# Patient Record
Sex: FEMALE | Race: WHITE | NOT HISPANIC OR LATINO | Employment: PART TIME | URBAN - METROPOLITAN AREA
[De-identification: names, ages, dates, MRNs, and addresses within clinical notes are randomized per-mention and may not be internally consistent; named-entity substitution may affect disease eponyms.]

---

## 2017-01-17 ENCOUNTER — ALLSCRIPTS OFFICE VISIT (OUTPATIENT)
Dept: OTHER | Facility: OTHER | Age: 57
End: 2017-01-17

## 2017-01-17 DIAGNOSIS — Z12.31 ENCOUNTER FOR SCREENING MAMMOGRAM FOR MALIGNANT NEOPLASM OF BREAST: ICD-10-CM

## 2017-01-23 ENCOUNTER — HOSPITAL ENCOUNTER (OUTPATIENT)
Dept: RADIOLOGY | Age: 57
Discharge: HOME | End: 2017-01-23
Payer: COMMERCIAL

## 2017-01-23 DIAGNOSIS — Z12.31 ENCOUNTER FOR SCREENING MAMMOGRAM FOR MALIGNANT NEOPLASM OF BREAST: ICD-10-CM

## 2017-01-23 PROCEDURE — G0202 SCR MAMMO BI INCL CAD: HCPCS

## 2017-01-24 ENCOUNTER — GENERIC CONVERSION - ENCOUNTER (OUTPATIENT)
Dept: OTHER | Facility: OTHER | Age: 57
End: 2017-01-24

## 2017-04-11 ENCOUNTER — ALLSCRIPTS OFFICE VISIT (OUTPATIENT)
Dept: OTHER | Facility: OTHER | Age: 57
End: 2017-04-11

## 2017-04-26 ENCOUNTER — ALLSCRIPTS OFFICE VISIT (OUTPATIENT)
Dept: OTHER | Facility: OTHER | Age: 57
End: 2017-04-26

## 2017-04-26 DIAGNOSIS — M25.50 PAIN IN JOINT: ICD-10-CM

## 2017-04-26 DIAGNOSIS — Z13.1 ENCOUNTER FOR SCREENING FOR DIABETES MELLITUS: ICD-10-CM

## 2017-04-26 DIAGNOSIS — Z13.220 ENCOUNTER FOR SCREENING FOR LIPOID DISORDERS: ICD-10-CM

## 2017-05-14 LAB
A/G RATIO (HISTORICAL): 1.5 (CALC) (ref 1–2.5)
ALBUMIN SERPL BCP-MCNC: 4.3 G/DL (ref 3.6–5.1)
ALP SERPL-CCNC: 71 U/L (ref 33–130)
ALT SERPL W P-5'-P-CCNC: 17 U/L (ref 6–29)
AST SERPL W P-5'-P-CCNC: 18 U/L (ref 10–35)
BASOPHILS # BLD AUTO: 0.7 %
BASOPHILS # BLD AUTO: 23 CELLS/UL (ref 0–200)
BILIRUB SERPL-MCNC: 0.6 MG/DL (ref 0.2–1.2)
BUN SERPL-MCNC: 16 MG/DL (ref 7–25)
BUN/CREA RATIO (HISTORICAL): NORMAL (CALC) (ref 6–22)
CALCIUM SERPL-MCNC: 9.7 MG/DL (ref 8.6–10.4)
CHLORIDE SERPL-SCNC: 102 MMOL/L (ref 98–110)
CHOLEST SERPL-MCNC: 215 MG/DL (ref 125–200)
CHOLEST/HDLC SERPL: 3 (CALC)
CO2 SERPL-SCNC: 29 MMOL/L (ref 20–31)
CREAT SERPL-MCNC: 0.85 MG/DL (ref 0.5–1.05)
DEPRECATED RDW RBC AUTO: 13.7 % (ref 11–15)
EGFR AFRICAN AMERICAN (HISTORICAL): 90 ML/MIN/1.73M2
EGFR-AMERICAN CALC (HISTORICAL): 78 ML/MIN/1.73M2
EOSINOPHIL # BLD AUTO: 248 CELLS/UL (ref 15–500)
EOSINOPHIL # BLD AUTO: 7.5 %
GAMMA GLOBULIN (HISTORICAL): 2.9 G/DL (CALC) (ref 1.9–3.7)
GLUCOSE (HISTORICAL): 85 MG/DL (ref 65–99)
HCT VFR BLD AUTO: 42.1 % (ref 35–45)
HDLC SERPL-MCNC: 71 MG/DL
HGB BLD-MCNC: 13.9 G/DL (ref 11.7–15.5)
LDL CHOLESTEROL (HISTORICAL): 126 MG/DL (CALC)
LYMPHOCYTES # BLD AUTO: 24.9 %
LYMPHOCYTES # BLD AUTO: 822 CELLS/UL (ref 850–3900)
MCH RBC QN AUTO: 29 PG (ref 27–33)
MCHC RBC AUTO-ENTMCNC: 33.1 G/DL (ref 32–36)
MCV RBC AUTO: 87.6 FL (ref 80–100)
MONOCYTES # BLD AUTO: 350 CELLS/UL (ref 200–950)
MONOCYTES (HISTORICAL): 10.6 %
NEUTROPHILS # BLD AUTO: 1858 CELLS/UL (ref 1500–7800)
NEUTROPHILS # BLD AUTO: 56.3 %
NON-HDL-CHOL (CHOL-HDL) (HISTORICAL): 144 MG/DL (CALC)
PLATELET # BLD AUTO: 237 THOUSAND/UL (ref 140–400)
PMV BLD AUTO: 10 FL (ref 7.5–12.5)
POTASSIUM SERPL-SCNC: 4.1 MMOL/L (ref 3.5–5.3)
RBC # BLD AUTO: 4.81 MILLION/UL (ref 3.8–5.1)
SODIUM SERPL-SCNC: 138 MMOL/L (ref 135–146)
TOTAL PROTEIN (HISTORICAL): 7.2 G/DL (ref 6.1–8.1)
TRIGL SERPL-MCNC: 89 MG/DL
WBC # BLD AUTO: 3.3 THOUSAND/UL (ref 3.8–10.8)

## 2017-05-15 ENCOUNTER — GENERIC CONVERSION - ENCOUNTER (OUTPATIENT)
Dept: OTHER | Facility: OTHER | Age: 57
End: 2017-05-15

## 2017-06-02 ENCOUNTER — HOSPITAL ENCOUNTER (OUTPATIENT)
Facility: HOSPITAL | Age: 57
Setting detail: OUTPATIENT SURGERY
Discharge: HOME | End: 2017-06-02
Attending: ORTHOPAEDIC SURGERY | Admitting: ORTHOPAEDIC SURGERY
Payer: COMMERCIAL

## 2017-06-02 VITALS
RESPIRATION RATE: 18 BRPM | OXYGEN SATURATION: 100 % | HEART RATE: 80 BPM | TEMPERATURE: 97.7 F | HEIGHT: 65 IN | SYSTOLIC BLOOD PRESSURE: 148 MMHG | WEIGHT: 185 LBS | BODY MASS INDEX: 30.82 KG/M2 | DIASTOLIC BLOOD PRESSURE: 76 MMHG

## 2017-06-02 RX ORDER — ESCITALOPRAM OXALATE 10 MG/1
10 TABLET ORAL DAILY
COMMUNITY
End: 2018-08-26 | Stop reason: SDUPTHER

## 2017-06-02 RX ORDER — IBUPROFEN 600 MG/1
600 TABLET ORAL EVERY 6 HOURS PRN
Qty: 30 TABLET | Refills: 0 | Status: SHIPPED | OUTPATIENT
Start: 2017-06-02 | End: 2020-08-31 | Stop reason: ALTCHOICE

## 2017-06-02 RX ORDER — HYDROCODONE BITARTRATE AND ACETAMINOPHEN 5; 325 MG/1; MG/1
1 TABLET ORAL EVERY 6 HOURS PRN
Qty: 10 TABLET | Refills: 0 | Status: SHIPPED | OUTPATIENT
Start: 2017-06-02 | End: 2017-06-12

## 2017-06-02 RX ORDER — CETIRIZINE HYDROCHLORIDE 10 MG/1
5 TABLET ORAL AS NEEDED
COMMUNITY
End: 2018-07-02 | Stop reason: ALTCHOICE

## 2017-06-02 RX ORDER — LANOLIN ALCOHOL/MO/W.PET/CERES
1 CREAM (GRAM) TOPICAL DAILY
COMMUNITY
End: 2018-05-16 | Stop reason: ALTCHOICE

## 2017-06-02 RX ORDER — MELOXICAM 15 MG/1
15 TABLET ORAL AS NEEDED
COMMUNITY
End: 2019-02-08 | Stop reason: SDUPTHER

## 2017-06-13 ENCOUNTER — ALLSCRIPTS OFFICE VISIT (OUTPATIENT)
Dept: OTHER | Facility: OTHER | Age: 57
End: 2017-06-13

## 2017-07-13 ENCOUNTER — ALLSCRIPTS OFFICE VISIT (OUTPATIENT)
Dept: OTHER | Facility: OTHER | Age: 57
End: 2017-07-13

## 2018-01-10 NOTE — RESULT NOTES
Verified Results  * MAMMO SCREENING BILATERAL W CAD [id number] 03:05PM Gerald Betancourt Order Number: [id number]    - Patient Instructions: To schedule this appointment, please contact Central Scheduling at [phone]  Do not wear any perfume, powder, lotion or deodorant on breast or underarm area  Please bring your doctors order, referral (if needed) and insurance information with you on the day of the test  Failure to bring this information may result in this test being rescheduled  Arrive 15 minutes prior to your appointment time to register  On the day of your test, please bring any prior mammogram or breast studies with you that were not performed at a [facility]  Failure to bring prior exams may result in your test needing to be rescheduled  Test Name Result Flag Reference   MAMMO SCREENING BILATERAL W CAD (Report)     Patient History:   Patient is postmenopausal and had first child at age 37  No known family history of cancer  Patient has never smoked  Patient's BMI is 29 3  Reason for exam: screening (asymptomatic)  Mammo Screening Bilateral W CAD: January 23, 2017 - Check In #:    [de-identified]   Bilateral MLO and CC view(s) were taken  Technologist: MANNY Van (MANNY)(M)   Prior study comparison: September 28, 2015, digital bilateral    screening mammogram performed at [address] [place] [address]  September 22, 2014, digital bilateral screening mammogram    performed at [address] [place] [address]  September 20, 2012,    digital bilateral screening mammogram performed at [facility]  July 22, 2011, digital bilateral screening    mammogram performed at [address] [place] [address]  July 16, 2010, digital bilateral screening mammogram performed at [address] [place] [address]  There are scattered fibroglandular densities  No new dominant    soft tissue mass, architectural distortion or suspicious    calcifications are noted   The skin and nipple contours are    within normal limits  No evidence of malignancy  No significant changes   when compared with prior studies  ASSESSMENT: BiRad:1 - Negative     Recommendation:   Routine screening mammogram of both breasts in 1 year  A    reminder letter will be scheduled  Analyzed by CAD     8-10% of cancers will be missed on mammography  Management of a    palpable abnormality must be based on clinical grounds  Patients   will be notified of their results via letter from our facility  Accredited by [employer] and FDA       Transcription Location: [facility] 98: [id number]     Risk Value(s):   Tyrer-Cuzick 10 Year: 3 810%, Tyrer-Cuzick Lifetime: 13 227%,    Myriad Table: 1 5%, ZENAIDA 5 Year: 1 7%, NCI Lifetime: 12 4%   Signed by:   Alona Horner MD   1/24/17       Discussion/Summary   normal mammogram   repeat in 1 year   - Dr Iman Gallardo   Electronically signed by : Bere Gresham MD; Jan 24 2017  9:56AM EST                       (Author)

## 2018-01-10 NOTE — PROGRESS NOTES
Assessment    1  Encounter for preventive health examination (V70 0) (Z00 00)   2  Depression with anxiety (300 4) (F41 8)    Plan  Arthralgia of multiple joints, PMH: History of fatigue, Vitamin D deficiency    · Meloxicam 15 MG Oral Tablet; TAKE 1 TABLET DAILY AS NEEDED  Depression with anxiety    · Escitalopram Oxalate 10 MG Oral Tablet (Lexapro); take 1 tablet by mouth every  day  Health Maintenance    · Follow-up visit in 1 year Evaluation and Treatment  Follow-up  Status: Hold For -  Scheduling  Requested for: 25Apr2016   · Begin a limited exercise program ; Status:Complete;   Done: 25Apr2016 04:17PM   · Brush your teeth freq1 and floss at least once a day ; Status:Complete;   Done:  91JUE9095 04:17PM   · Eat a low fat and low cholesterol diet ; Status:Complete;   Done: 53DPW8509 04:17PM   · Some eating tips that can help you lose weight ; Status:Complete;   Done: 93PWK2936  04:17PM   · Use a sun block product with an SPF of 15 or more ; Status:Complete;   Done:  13HUP4496 04:17PM   · Vitamins can help you get daily requirements that your diet may not be giving you ;  Status:Complete;   Done: 73IHN2736 04:17PM   · We recommend routine visits to a dentist ; Status:Complete;   Done: 79SLG6598 04:17PM   · We recommend that you bring your body mass index down to 26 ; Status:Complete;    Done: 76NVB9609 04:17PM   · We recommend that you get 400 IU of Vitamin D each day ; Status:Complete;   Done:  16XBL2002 04:17PM   · We recommend you modify your diet to achieve and maintain a healthy weight  Being  overweight may increase your risk for developing health problems such as diabetes,  heart disease, and cancer  Avoid high fat foods and eat a balanced diet rich  in fruits and vegetables  The combination of a reduced-calorie diet and increased  physical activity is recommended    Please let us know if you would like to  learn more about your nutrition and calorie needs, and additional options including  weight loss programs that can help you achieve your goals ; Status:Complete;   Done:  25Apr2016 04:17PM   · You need to quit smoking ; Status:Complete;   Done: 25Apr2016 04:17PM   · Call [phone] if: You find a new or different kind of lump in your breast ;  Status:Complete;   Done: 82OYF9175 04:17PM   · Call [phone] if: You have any bleeding from the vagina ; Status:Complete;    Done: 25Apr2016 04:17PM   · Call [phone] if: You have any warning signs of skin cancer ; Status:Complete;    Done: 22KZZ9862 04:17PM   · Call [phone] if: You experience a new kind of chest pain (angina) or pressure ;  Status:Complete;   Done: 55GMY0888 04:17PM  Visit for screening mammogram    · * MAMMO SCREENING BILATERAL W CAD; Status:Complete;   Done: 83IJD8208  12:00AM    Discussion/Summary   mammogram within the last year  due to see Dr Cerrato ( GYN)    health maintenance visit Currently, she eats a healthy diet  the risks and benefits of cervical cancer screening were discussed cervical cancer screening is managed by Dr Bell Fret Breast cancer screening: the risks and benefits of breast cancer screening were discussed and mammogram is current  Colorectal cancer screening: the risks and benefits of colorectal cancer screening were discussed, colorectal cancer screening is current and colonoscopy is needed every five years  Osteoporosis screening: the risks and benefits of osteoporosis screening were discussed and bone mineral density testing is not indicated  She was advised to be evaluated by an ophthalmologist and a dentist  Advice and education were given regarding nutrition, aerobic exercise, weight loss, calcium supplements, vitamin D supplements, alcohol use, sunscreen use, self skin examination, helmet use and seat belt use  Patient discussion: discussed with the patient  Chief Complaint  Pt is here for wellness exam      History of Present Illness  HM, Adult Female:  The patient is being seen for a health maintenance evaluation  The last health maintenance visit was 1 year(s) ago, pap smear with Dr Madelaine Bhakta 1 year  General Health: The patient's health since the last visit is described as good  She has regular dental visits  Immunizations status: not up to date  Lifestyle:  She consumes a diverse and healthy diet  She does not have any weight concerns  She exercises regularly  She does not use tobacco  She denies alcohol use  She denies drug use  Reproductive health:  she reports abnormal menses  she uses no contraception  she is sexually active  pregnancy history: G 2P 2, 2  Screening: cancer screening reviewed and updated  metabolic screening reviewed and updated  risk screening reviewed and updated  HPI: meloxicam working with her joint pain  exercising now and eating healthier        Review of Systems    Constitutional: No fever, no chills, feels well, no tiredness, no recent weight gain or weight loss  Eyes: No complaints of eye pain, no red eyes, no eyesight problems, no discharge, no dry eyes, no itching of eyes  ENT: no complaints of earache, no loss of hearing, no nose bleeds, no nasal discharge, no sore throat, no hoarseness  Cardiovascular: No complaints of slow heart rate, no fast heart rate, no chest pain, no palpitations, no leg claudication, no lower extremity edema  Respiratory: No complaints of shortness of breath, no wheezing, no cough, no SOB on exertion, no orthopnea, no PND  Gastrointestinal: No complaints of abdominal pain, no constipation, no nausea or vomiting, no diarrhea, no bloody stools  Genitourinary: No complaints of dysuria, no incontinence, no pelvic pain, no dysmenorrhea, no vaginal discharge or bleeding  Musculoskeletal: No complaints of arthralgias, no myalgias, no joint swelling or stiffness, no limb pain or swelling  Integumentary: No complaints of skin rash or lesions, no itching, no skin wounds, no breast pain or lump     Neurological: No complaints of headache, no confusion, no convulsions, no numbness, no dizziness or fainting, no tingling, no limb weakness, no difficulty walking  Psychiatric: Not suicidal, no sleep disturbance, no anxiety or depression, no change in personality, no emotional problems  Endocrine: No complaints of proptosis, no hot flashes, no muscle weakness, no deepening of the voice, no feelings of weakness  Hematologic/Lymphatic: No complaints of swollen glands, no swollen glands in the neck, does not bleed easily, does not bruise easily  Active Problems    1  Allergic rhinitis (477 9) (J30 9)   2  Arthralgia of multiple joints (719 49) (M25 50)   3  Bilateral hand numbness (782 0) (R20 0)   4  Carpal tunnel syndrome of left wrist (354 0) (G56 02)   5  Carpal tunnel syndrome of right wrist (354 0) (G56 01)   6  Depression with anxiety (300 4) (F41 8)   7  Need for prophylactic vaccination and inoculation against influenza (V04 81) (Z23)   8  Panic Disorder Without Agoraphobia (300 01)   9  Screening for colon cancer (V76 51) (Z12 11)   10  Screening for diabetes mellitus (DM) (V77 1) (Z13 1)   11  Screening for lipoid disorders (V77 91) (Z13 220)   12  Vitamin D deficiency (268 9) (E55 9)   13   Wrist pain, left (719 43) (M25 532)    Past Medical History    · History of Depression (Symptom)   · History of acute gastritis (V12 70) (Z87 19)   · History of dermatitis (V13 3) (Z87 2)   · History of hemorrhoids (V13 89) (Z87 19)   · History of migraine (V12 49) (Z86 69)   · History of sinusitis (V12 69) (Z87 09)   · History of vertigo (V12 49) (Z87 898)   · History of Impacted cerumen, unspecified laterality (380 4) (H61 20)   · History of Lumbar Strain (847 2)   · 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)   · History of Open Wound Of The Left Index Finger (883 0)   · History of Palpitations (785 1) (R00 2)   · History of Plantar fasciitis (728 71) (M72 2)   · History of Previous Pregnancy - Vaginal Delivery   · History of Urinary Tract Infection (V13 02)    Surgical History    · History of Cholecystectomy    Family History  Mother    · Family history of Aneurysm Of Abdominal Aorta   · Family history of Arthritis (V17 7)   · Family history of Diverticulitis Of Colon   · Family history of Pancreatitis   · Family history of Tachycardia  Sister    · Family history of Neoplasm Of The Brain   · Family history of Nontropical (Celiac) Sprue  Paternal Grandmother    · Family history of Diabetes Mellitus (V18 0)    Social History    · Alcohol Use (History)   · 1/mo   · Denied: History of Drug Use   · Exercise Habits   · 1x week for 45 min   · Living Situations   · with , son, daughter, dog   · Never A Smoker   · Occupation:   · [employer]    Current Meds   1  Caltrate 600+D 600-800 MG-UNIT Oral Tablet; Therapy: 48OYU8432 to Recorded   2  Escitalopram Oxalate 10 MG Oral Tablet; take 1 tablet by mouth every day; Therapy: 47MKQ9267 to (Evaluate:26Apr2016)  Requested for: 90Yne7421; Last   Rx:[id number] Ordered   3  Meloxicam 15 MG Oral Tablet; TAKE 1 TABLET DAILY AS NEEDED; Therapy: 92EOO1066 to (Raj Lowers)  Requested for: 83DRZ0498; Last   Rx:09Mar2016 Ordered   4  Vitamin D 1000 UNIT Oral Tablet; TAKE 1 TABLET ORALLY DAILY (SUPPLEMENT); Therapy: 23Apr2014 to (Last Rx:23Apr2014) Ordered   5  ZyrTEC Allergy 10 MG Oral Tablet; TAKE 1 TABLET DAILY; Therapy: 44RBA6768 to (Evaluate:04Mmo1461) Recorded    Allergies    1  Amoxicillin CAPS   2  Penicillins    Vitals   Recorded: 25Apr2016 03:39PM   Heart Rate 64   Systolic 099   Diastolic 82   Height 5 ft 4 88 in   Weight 183 lb 2 oz   BMI Calculated 30 59   BSA Calculated 1 9     Physical Exam    Constitutional   General appearance: No acute distress, well appearing and well nourished  Head and Face   Head and face: Normal     Palpation of the face and sinuses: No sinus tenderness  Eyes   Conjunctiva and lids: No swelling, erythema or discharge      Pupils and irises: Equal, round, reactive to light  Ophthalmoscopic examination: Normal fundi and optic discs  Ears, Nose, Mouth, and Throat   External inspection of ears and nose: Normal     Otoscopic examination: Tympanic membranes translucent with normal light reflex  Canals patent without erythema  Hearing: Normal     Nasal mucosa, septum, and turbinates: Normal without edema or erythema  Lips, teeth, and gums: Normal, good dentition  Oropharynx: Normal with no erythema, edema, exudate or lesions  Neck   Neck: Supple, symmetric, trachea midline, no masses  Thyroid: Normal, no thyromegaly  Pulmonary   Respiratory effort: No increased work of breathing or signs of respiratory distress  Percussion of chest: Normal     Cardiovascular   Palpation of heart: Normal PMI, no thrills  Auscultation of heart: Normal rate and rhythm, normal S1 and S2, no murmurs  Chest   Chest: Normal     Abdomen   Abdomen: Non-tender, no masses  Liver and spleen: No hepatomegaly or splenomegaly  Examination for hernias: No hernia appreciated  Lymphatic   Palpation of lymph nodes in neck: No lymphadenopathy  Palpation of lymph nodes in axillae: No lymphadenopathy  Musculoskeletal   Gait and station: Normal     Digits and nails: Normal without clubbing or cyanosis  Joints, bones, and muscles: Normal     Range of motion: Normal     Stability: Normal     Muscle strength/tone: Normal     Skin   Skin and subcutaneous tissue: Normal without rashes or lesions  Palpation of skin and subcutaneous tissue: Normal turgor  Neurologic   Cranial nerves: Cranial nerves II-XII intact  Cortical function: Normal mental status  Reflexes: 2+ and symmetric  Sensation: No sensory loss  Coordination: Normal finger to nose and heel to shin  Psychiatric   Judgment and insight: Normal     Orientation to person, place, and time: Normal     Recent and remote memory: Intact      Mood and affect: Normal        Results/Data  * MAMMO SCREENING BILATERAL W CAD [id number] 12:00AM SerafinSachaEmily     Test Name Result Flag Reference   MAMMO SCREENING BILATERAL W CAD 09/22/2014       Summary / No summary entered :      No summary entered  Documents attached :      Adrian Villavicencio; Enc: [id number] - Image Encounter - Katerina Villavicencio - (Family      Medicine) (Additional Information Document)    Future Appointments    Date/Time Provider Specialty Site   04/26/2017 03:30 PM Lashanda Betancourt MD [place] [address]   Electronically signed by : Velia Vela MD; Apr 25 2016  4:17PM EST                       (Author)

## 2018-01-12 VITALS
HEIGHT: 65 IN | DIASTOLIC BLOOD PRESSURE: 82 MMHG | BODY MASS INDEX: 31.49 KG/M2 | HEART RATE: 86 BPM | WEIGHT: 189 LBS | SYSTOLIC BLOOD PRESSURE: 119 MMHG

## 2018-01-12 VITALS
WEIGHT: 187 LBS | SYSTOLIC BLOOD PRESSURE: 127 MMHG | HEIGHT: 65 IN | HEART RATE: 80 BPM | BODY MASS INDEX: 31.16 KG/M2 | DIASTOLIC BLOOD PRESSURE: 80 MMHG

## 2018-01-13 VITALS
HEIGHT: 65 IN | RESPIRATION RATE: 18 BRPM | SYSTOLIC BLOOD PRESSURE: 122 MMHG | BODY MASS INDEX: 31.82 KG/M2 | WEIGHT: 191 LBS | HEART RATE: 88 BPM | DIASTOLIC BLOOD PRESSURE: 82 MMHG

## 2018-01-13 NOTE — PROGRESS NOTES
Assessment    1  Encounter for preventive health examination (V70 0) (Z00 00)   2  Arthralgia of multiple joints (719 49) (M25 50)   3  Screening for lipoid disorders (V77 91) (Z13 220)   4  Screening for diabetes mellitus (DM) (V77 1) (Z13 1)   5  Vitamin D deficiency (268 9) (E55 9)   6  Carpal tunnel syndrome of left wrist (354 0) (G56 02)   7  Carpal tunnel syndrome of right wrist (354 0) (G56 01)    Plan  Arthralgia of multiple joints    · 3 - Field Merry CAPELLAN  (Rheumatology) Co-Management  *  Status: Hold For - Scheduling   Requested for: 26Apr2017  are Referring to a non- Preferred Provider : Established Patient  Care Summary provided  : Yes  Arthralgia of multiple joints, PMH: History of fatigue, Vitamin D deficiency    · Meloxicam 15 MG Oral Tablet; TAKE 1 TABLET DAILY AS NEEDED  Arthralgia of multiple joints, Screening for diabetes mellitus (DM), Screening for lipoid  disorders    · (1) CBC/PLT/DIFF; Status:Active; Requested for:26Apr2017;    · (1) COMPREHENSIVE METABOLIC PANEL; Status:Active; Requested for:26Apr2017;    · (1) LIPID PANEL, FASTING; Status:Active; Requested for:26Apr2017; Health Maintenance    · Follow-up visit in 1 year Evaluation and Treatment  Follow-up  Status: Hold For -  Scheduling  Requested for: 26Apr2017   · Begin a limited exercise program ; Status:Complete;   Done: 62YNA1606 04:10PM   · Begin or continue regular aerobic exercise   Gradually work up to at least 3 sessions of 30  minutes of exercise a week ; Status:Complete;   Done: 26Apr2017 04:10PM   · Brush your teeth {freq1} and floss at least once a day ; Status:Complete;   Done:  26Apr2017 04:10PM   · Diets that are low in carbohydrates and high in protein are very popular for weight loss ;  Status:Complete;   Done: 13DUO2751 04:10PM   · Drink plenty of fluids ; Status:Complete;   Done: 80QLJ9663 04:10PM   · Eat a low fat and low cholesterol diet ; Status:Complete;   Done: 70SKO6101 04:10PM   · Some eating tips that can help you lose weight ; Status:Complete;   Done: 50EZH2730  04:10PM   · Use a sun block product with an SPF of 15 or more ; Status:Complete;   Done:  14QJE4585 04:10PM   · Vitamins can help you get daily requirements that your diet may not be giving you ;  Status:Complete;   Done: 26Apr2017 04:10PM   · We recommend routine visits to a dentist ; Status:Complete;   Done: 38ZIC0313 04:10PM   · We recommend that you bring your body mass index down to 26 ; Status:Complete;    Done: 26Apr2017 04:10PM   · We recommend that you get 400 IU of Vitamin D each day ; Status:Complete;   Done:  26Apr2017 04:10PM   · Call [phone] if: You find a new or different kind of lump in your breast ;  Status:Complete;   Done: 32QQN8575 04:10PM   · Call [phone] if: You have any bleeding from the vagina ; Status:Complete;    Done: 26Apr2017 04:10PM   · Call [phone] if: You have any warning signs of skin cancer ; Status:Complete;    Done: 69NSB1203 04:10PM    Discussion/Summary  health maintenance visit Currently, she eats an adequate diet  the risks and benefits of cervical cancer screening were discussed cervical cancer screening is needed every three years Breast cancer screening: the risks and benefits of breast cancer screening were discussed and breast cancer screening is not indicated  Colorectal cancer screening: the risks and benefits of colorectal cancer screening were discussed and colorectal cancer screening is not indicated  Osteoporosis screening: the risks and benefits of osteoporosis screening were discussed and bone mineral density testing is not indicated  Screening lab work includes glucose and lipid profile  The immunizations are up to date   She was advised to be evaluated by an ophthalmologist and a dentist  Advice and education were given regarding aerobic exercise, weight bearing exercise, weight loss, calcium supplements, vitamin D supplements, reproductive health, contraception, alcohol use, sunscreen use, self skin examination and helmet use  GETTING RIGHT CARPEL TUNNEL RELEASE SURGERY ON 5/12/17     Chief Complaint  pt here for physical  pt also has aches and pains  History of Present Illness  HM, Adult Female: The patient is being seen for a health maintenance evaluation  The last health maintenance visit was 1 year(s) ago  General Health: The patient's health since the last visit is described as good  She has regular dental visits  She complains of vision problems  She denies hearing loss  Immunizations status: not up to date  Lifestyle:  She does not have a healthy diet  She does not have any weight concerns  She does not exercise regularly  She does not use tobacco  She denies alcohol use  She denies drug use  Reproductive health:  she reports normal menses  she uses no contraception  she is sexually active  pregnancy history: G 2P 2, 2  Screening: cancer screening reviewed and updated  metabolic screening reviewed and updated  risk screening reviewed and updated  HPI: STILL HAVING PAIN ON RIGHT UPPER BACK AND LEFT LEG AT TIMES  ACHES AND PAIN ALL OVER THE JOINTS   FEELS LIKE THE PAIN HAS WORSENING SINCE LAST YEAR  ALL RHEUMATOID PANEL LAST YEAR WAS NORMAL EXCEPT FOR ELEVATED C-REACTIVE PROTEIN      Review of Systems    Constitutional: No fever, no chills, feels well, no tiredness, no recent weight gain or weight loss  Eyes: No complaints of eye pain, no red eyes, no eyesight problems, no discharge, no dry eyes, no itching of eyes  ENT: no complaints of earache, no loss of hearing, no nose bleeds, no nasal discharge, no sore throat, no hoarseness  Cardiovascular: No complaints of slow heart rate, no fast heart rate, no chest pain, no palpitations, no leg claudication, no lower extremity edema  Respiratory: No complaints of shortness of breath, no wheezing, no cough, no SOB on exertion, no orthopnea, no PND     Gastrointestinal: No complaints of abdominal pain, no constipation, no nausea or vomiting, no diarrhea, no bloody stools  Genitourinary: No complaints of dysuria, no incontinence, no pelvic pain, no dysmenorrhea, no vaginal discharge or bleeding  Musculoskeletal: No complaints of arthralgias, no myalgias, no joint swelling or stiffness, no limb pain or swelling  Integumentary: No complaints of skin rash or lesions, no itching, no skin wounds, no breast pain or lump  Neurological: numbness and tingling, but no headache, no confusion and no convulsions  Psychiatric: Not suicidal, no sleep disturbance, no anxiety or depression, no change in personality, no emotional problems  Endocrine: No complaints of proptosis, no hot flashes, no muscle weakness, no deepening of the voice, no feelings of weakness  Hematologic/Lymphatic: No complaints of swollen glands, no swollen glands in the neck, does not bleed easily, does not bruise easily  Active Problems    1  Allergic rhinitis (477 9) (J30 9)   2  Arthralgia of multiple joints (719 49) (M25 50)   3  Bilateral hand numbness (782 0) (R20 0)   4  Carpal tunnel syndrome of left wrist (354 0) (G56 02)   5  Carpal tunnel syndrome of right wrist (354 0) (G56 01)   6  Depression screen (V79 0) (Z13 89)   7  Depression with anxiety (300 4) (F41 8)   8  Encounter for gynecological examination without abnormal finding (V72 31) (Z01 419)   9  Mid back pain on right side (724 5) (M54 9)   10  Need for prophylactic vaccination and inoculation against influenza (V04 81) (Z23)   11  Panic Disorder Without Agoraphobia (300 01)   12  Screening for colon cancer (V76 51) (Z12 11)   13  Screening for diabetes mellitus (DM) (V77 1) (Z13 1)   14  Screening for lipoid disorders (V77 91) (Z13 220)   15  Visit for screening mammogram (V76 12) (Z12 31)   16  Vitamin D deficiency (268 9) (E55 9)   17   Wrist pain, left (719 43) (M25 532)    Past Medical History    · History of Depression (Symptom)   · History of acute gastritis (V12 70) (Z87 19)   · History of dermatitis (V13 3) (Z87 2)   · History of hemorrhoids (V13 89) (Z87 19)   · History of migraine (V12 49) (Z86 69)   · History of sinusitis (V12 69) (Z87 09)   · History of vertigo (V12 49) (Z87 898)   · History of Impacted cerumen, unspecified laterality (380 4) (H61 20)   · History of Lumbar Strain (847 2)   · 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)   · History of Open Wound Of The Left Index Finger (883 0)   · History of Palpitations (785 1) (R00 2)   · History of Plantar fasciitis (728 71) (M72 2)   · History of Previous Pregnancy - Vaginal Delivery   · History of Urinary Tract Infection (V13 02)    Surgical History    · History of Cholecystectomy    Family History  Mother    · Family history of Aneurysm Of Abdominal Aorta   · Family history of Arthritis (V17 7)   · Family history of Diverticulitis Of Colon   · Family history of Pancreatitis   · Family history of Tachycardia  Sister    · Family history of Neoplasm Of The Brain   · Family history of Nontropical (Celiac) Sprue  Paternal Grandmother    · Family history of Diabetes Mellitus (V18 0)    Social History    · Alcohol Use (History)   · 1/mo   · Denied: History of Drug Use   · Exercise Habits   · 1x week for 45 min   · Living Situations   · with , son, daughter, dog   · Never A Smoker   · Occupation:   · [employer]    Current Meds   1  Caltrate 600+D 600-800 MG-UNIT Oral Tablet; Therapy: 68GYN1163 to Recorded   2  Cyclobenzaprine HCl - 10 MG Oral Tablet; TAKE 1 TABLET 3 TIMES DAILY AS NEEDED; Therapy: 77BDF0139 to (Evaluate:27Jan2017)  Requested for: 06HSL5137; Last   Rx:17Jan2017 Ordered   3  Escitalopram Oxalate 10 MG Oral Tablet; take 1 tablet by mouth every day; Therapy: 15EFO8019 to (Evaluate:16Rvb2051)  Requested for: 09Apr2017; Last   Rx:09Apr2017 Ordered   4  Meloxicam 15 MG Oral Tablet; TAKE 1 TABLET DAILY AS NEEDED;    Therapy: 73MYB2701 to (Evaluate:24Jun2016)  Requested for: 25Apr2016; Last Rx:00Aqx1248 Ordered   5  Vitamin D 1000 UNIT Oral Tablet; TAKE 1 TABLET ORALLY DAILY (SUPPLEMENT); Therapy: 31Hxw4712 to (Last Rx:23Apr2014) Ordered   6  ZyrTEC Allergy 10 MG Oral Tablet; TAKE 1 TABLET DAILY; Therapy: 08SFR8776 to (Evaluate:86Crt9743) Recorded    Allergies    1  Amoxicillin CAPS   2  Penicillins    Vitals   Recorded: 26Apr2017 03:29PM   Heart Rate 88   Respiration 18   Systolic 245   Diastolic 82   Height 5 ft 4 57 in   Weight 191 lb    BMI Calculated 32 21   BSA Calculated 1 93     Physical Exam    Constitutional   General appearance: No acute distress, well appearing and well nourished  Head and Face   Head and face: Normal     Palpation of the face and sinuses: No sinus tenderness  Eyes   Conjunctiva and lids: No swelling, erythema or discharge  Pupils and irises: Equal, round, reactive to light  Ophthalmoscopic examination: Normal fundi and optic discs  Ears, Nose, Mouth, and Throat   External inspection of ears and nose: Normal     Otoscopic examination: Tympanic membranes translucent with normal light reflex  Canals patent without erythema  Hearing: Normal     Nasal mucosa, septum, and turbinates: Normal without edema or erythema  Neck   Neck: Supple, symmetric, trachea midline, no masses  Thyroid: Normal, no thyromegaly  Pulmonary   Respiratory effort: No increased work of breathing or signs of respiratory distress  Percussion of chest: Normal     Auscultation of lungs: Clear to auscultation  Cardiovascular   Palpation of heart: Normal PMI, no thrills  Auscultation of heart: Normal rate and rhythm, normal S1 and S2, no murmurs  Carotid pulses: 2+ bilaterally  Examination of extremities for edema and/or varicosities: Normal     Chest   Chest: Normal     Abdomen   Abdomen: Non-tender, no masses  Liver and spleen: No hepatomegaly or splenomegaly  Examination for hernias: No hernia appreciated      Lymphatic   Palpation of lymph nodes in neck: No lymphadenopathy  Palpation of lymph nodes in axillae: No lymphadenopathy  Musculoskeletal   Gait and station: Normal     Digits and nails: Normal without clubbing or cyanosis  Joints, bones, and muscles: Normal     Range of motion: Normal     Stability: Normal     Muscle strength/tone: Normal     Skin   Skin and subcutaneous tissue: Normal without rashes or lesions  Palpation of skin and subcutaneous tissue: Normal turgor  Neurologic   Cranial nerves: Cranial nerves II-XII intact  Cortical function: Normal mental status  Reflexes: 2+ and symmetric  Sensation: No sensory loss  Coordination: Normal finger to nose and heel to shin  Psychiatric   Judgment and insight: Normal     Orientation to person, place, and time: Normal     Recent and remote memory: Intact  Mood and affect: Normal        Results/Data  PHQ-9 Adult Depression Screening 26Apr2017 03:34PM User, [facility]     Test Name Result Flag Reference   PHQ-9 Adult Depression Score 5     Over the last two weeks, how often have you been bothered by any of the following problems? Little interest or pleasure in doing things: Not at all - 0  Feeling down, depressed, or hopeless: Not at all - 0  Trouble falling or staying asleep, or sleeping too much: Several days - 1  Feeling tired or having little energy: Nearly every day - 3  Poor appetite or over eating: Several days - 1  Feeling bad about yourself - or that you are a failure or have let yourself or your family down: Not at all - 0  Trouble concentrating on things, such as reading the newspaper or watching television: Not at all - 0  Moving or speaking so slowly that other people could have noticed   Or the opposite -  being so fidgety or restless that you have been moving around a lot more than usual: Not at all - 0  Thoughts that you would be better off dead, or of hurting yourself in some way: Not at all - 0   PHQ-9 Adult Depression Screening Negative     PHQ-9 Difficulty Level Somewhat difficult     PHQ-9 Severity Mild Depression         Future Appointments    Date/Time Provider Specialty Site   05/12/2017 08:30 AM VEGA Norton  [address] [place] [address] OR   05/23/2017 02:20 PM VEGA Norton   Orthopedic Surgery [facility]     Signatures   Electronically signed by : Maine Stewart MD; Apr 26 2017  4:13PM EST                       (Author)

## 2018-01-14 VITALS
DIASTOLIC BLOOD PRESSURE: 85 MMHG | HEART RATE: 88 BPM | HEIGHT: 65 IN | BODY MASS INDEX: 31.65 KG/M2 | SYSTOLIC BLOOD PRESSURE: 143 MMHG | WEIGHT: 190 LBS

## 2018-01-14 NOTE — RESULT NOTES
Discussion/Summary   cholesterol looks good   normal liver and kidney function    slightly low white blood cell count but close to normal range   will continue to monitor   - Dr Abbey Loyd     Verified Results  (1) LIPID PANEL, FASTING [id number] 09:53AM Emily Betancourt     Test Name Result Flag Reference   CHOLESTEROL, TOTAL 215 mg/dL H 125-200   HDL CHOLESTEROL 71 mg/dL  > OR = 46   TRIGLICERIDES 89 mg/dL  <729   LDL-CHOLESTEROL 126 mg/dL (calc)  <130   Desirable range <100 mg/dL for patients with CHD or  diabetes and <70 mg/dL for diabetic patients with  known heart disease  CHOL/HDLC RATIO 3 0 (calc)  < OR = 5 0   NON HDL CHOLESTEROL 144 mg/dL (calc)     Target for non-HDL cholesterol is 30 mg/dL higher than   LDL cholesterol target  (1) COMPREHENSIVE METABOLIC PANEL 39UNQ3875 85:20FD Emily Betancourt     Test Name Result Flag Reference   GLUCOSE 85 mg/dL  65-99   Fasting reference interval   UREA NITROGEN (BUN) 16 mg/dL  7-25   CREATININE 0 85 mg/dL  0 50-1 05   For patients >52years of age, the reference limit  for Creatinine is approximately 13% higher for people  identified as -American  eGFR NON-AFR   AMERICAN 78 mL/min/1 73m2  > OR = 60   eGFR AFRICAN AMERICAN 90 mL/min/1 73m2  > OR = 60   BUN/CREATININE RATIO   6-89   NOT APPLICABLE (calc)   SODIUM 138 mmol/L  135-146   POTASSIUM 4 1 mmol/L  3 5-5 3   CHLORIDE 102 mmol/L     CARBON DIOXIDE 29 mmol/L  20-31   CALCIUM 9 7 mg/dL  8 6-10 4   PROTEIN, TOTAL 7 2 g/dL  6 1-8 1   ALBUMIN 4 3 g/dL  3 6-5 1   GLOBULIN 2 9 g/dL (calc)  1 9-3 7   ALBUMIN/GLOBULIN RATIO 1 5 (calc)  1 0-2 5   BILIRUBIN, TOTAL 0 6 mg/dL  0 2-1 2   ALKALINE PHOSPHATASE 71 U/L     AST 18 U/L  10-35   ALT 17 U/L  6-29     (1) CBC/PLT/DIFF 93DIZ7900 09:53AM Emily Betancourt   REPORT COMMENT:  FASTING:YES     Test Name Result Flag Reference   WHITE BLOOD CELL COUNT 3 3 Thousand/uL L 3 8-10 8   RED BLOOD CELL COUNT 4 81 Million/uL  3 80-5 10   HEMOGLOBIN 13 9 g/dL  11 7-15 5 HEMATOCRIT 42 1 %  35 0-45 0   MCV 87 6 fL  80 0-100 0   MCH 29 0 pg  27 0-33 0   MCHC 33 1 g/dL  32 0-36 0   RDW 13 7 %  11 0-15 0   PLATELET COUNT 131 Thousand/uL  140-400   ABSOLUTE NEUTROPHILS 1858 cells/uL  0947-2374   ABSOLUTE LYMPHOCYTES 822 cells/uL L 850-3900   ABSOLUTE MONOCYTES 350 cells/uL  200-950   ABSOLUTE EOSINOPHILS 248 cells/uL     ABSOLUTE BASOPHILS 23 cells/uL  0-200   NEUTROPHILS 56 3 %     LYMPHOCYTES 24 9 %     MONOCYTES 10 6 %     EOSINOPHILS 7 5 %     BASOPHILS 0 7 %     MPV 10 0 fL  7 5-12 5       Signatures   Electronically signed by : Luciano Maddox MD; May 15 2017 12:16PM EST                       (Author)

## 2018-01-15 VITALS
DIASTOLIC BLOOD PRESSURE: 90 MMHG | HEART RATE: 68 BPM | SYSTOLIC BLOOD PRESSURE: 120 MMHG | WEIGHT: 186.13 LBS | RESPIRATION RATE: 18 BRPM | BODY MASS INDEX: 31.09 KG/M2

## 2018-01-16 NOTE — RESULT NOTES
Verified Results  (1) FREDO SCREEN W/REFLEX TO TITER/PATTERN [id number] 07:41AM [employer] Order Number: [id number]     Test Name Result Flag Reference   FREDO SCREEN  Positive A Negative   FREDO TITER 1 40 IU/mL     FREDO PATTERN 1 Centromere pattern       (1) C-REACTIVE PROTEIN [id number] 07:41[employer] Order Number: [id number]     Test Name Result Flag Reference   C-REACT PROTEIN 3 8 mg/L H <3 0     (1) SED RATE [id number] 07:41[employer] Order Number: [id number]     Test Name Result Flag Reference   SED RATE 20 mm/hour  0-20     (1) LYME ANTIBODY PROFILE W/REFLEX TO WESTERN BLOT [id number] 07:41[employer] Order Number: [id number]     Test Name Result Flag Reference   LYME IGG 0 10  0 00-0 79   NEGATIVE(0 00-0 79)-Absence of detectable Borrelia IgG Antibodies  A negative result does not exclude the possibility of Borrelia infection  If early Lyme disease is suspected,a second sample should be collected & tested 4 weeks after initial testing  LYME IGM 0 28  0 00-0 79   NEGATIVE (0 00-0 79)-Absence of detectable Borrelia IgM antibodies  A negative result does not exclude the possibility of Borrelia infection  If early lyme disease is suspected, a second sample should be collected & tested 4 weeks after initial testing      (1) TSH WITH FT4 REFLEX [id number] 07:41AM [employer] Order Number: [id number]    Patients undergoing fluorescein dye angiography may retain small amounts of fluorescein in the body for 48-72 hours post procedure  Samples containing fluorescein can produce falsely depressed TSH values  If the patient had this procedure,a specimen should be resubmitted post fluorescein clearance          The recommended reference ranges for TSH during pregnancy are as follows:  First trimester 0 1 to 2 5 uIU/mL  Second trimester  0 2 to 3 0 uIU/mL  Third trimester 0 3 to 3 0 uIU/m     Test Name Result Flag Reference   TSH 1 460 uIU/mL  0 358-3 740     (1) LIPID PANEL, FASTING [id number] 07:41AM Logan Betanc[facility] [employer] Order Number: [id number]      Triglyceride:         Normal              <150 mg/dl       Borderline High    150-199 mg/dl       High               200-499 mg/dl       Very High          >499 mg/dl  Cholesterol:         Desirable        <200 mg/dl      Borderline High  200-239 mg/dl      High             >239 mg/dl  HDL Cholesterol:        High    >59 mg/dL      Low     <41 mg/dL     Test Name Result Flag Reference   CHOLESTEROL 231 mg/dL H    HDL,DIRECT 67 mg/dL H 40-60   LDL CHOLESTEROL CALCULATED 142 mg/dL H 0-100   TRIGLYCERIDES 109 mg/dL  <=150     (1) HEMOGLOBIN A1C [id number] 07:41AM Logan [facility] [employer] Order Number: [id number]      5 7-6 4% impaired fasting glucose  >=6 5% diagnosis of diabetes    Falsely low levels are seen in conditions linked to short RBC life span-  hemolytic anemia, and splenomegaly  Falsely elevated levels are seen in situations where there is an increased production of RBC- receipt of erythropoietin or blood transfusions  Adopted from ADA-Clinical Practice Recommendations     Test Name Result Flag Reference   HEMOGLOBIN A1C 5 6 %  4 0-5 6   EST  AVG   GLUCOSE 114 mg/dl       (1) VITAMIN D 25-HYDROXY [id number] 07:41AM Logan Bet[facility] [employer] Order Number: [id number]    TW Order Number: [id number]     Test Name Result Flag Reference   VIT D 25-HYDROX 28 8 ng/mL L 30 0-100 0     (1) DNA (DS) ANTIBODY [id number] 07:41AM [employer] Order Number: [id number]    Performed at:   [address]  [id number]  : Jeffrey Giles MD, Phone:  [phone]     Test Name Result Flag Reference   DNA (DS) ANTIB 1 IU/mL  0 - 9   Negative      <5                                   Equivocal  5 - 9                                   Positive      >9     (1) SJOGREN'S ANTIBODIES [id number] 07:41AM [employer] Order Number: [id number]    Performed at:  [address] [id number]  : Pedro Woody MD, Phone:  [phone]     Test Name Result Flag Reference   SS-A <0 2 AI  0 0 - 0 9   SS-B <0 2 AI  0 0 - 0 9     (1) CYCLIC CITRULLINATED PEPTIDE [id number] 07:41AM Alon Betancourt Order Number: [id number]    Performed at:  [address]  [id number]  : Alba Hayes MD, Phone:  [phone]     Test Name Result Flag Reference   CYCLIC CITRULLINATED PEPTIDE 2 units  0 - 19   Negative               <20                          Weak positive      20 - 39                          Moderate positive  40 - 59                          Strong positive        >59     (1) TISSUE TRANSGLUTAMINASE IGA [id number] 07:41AM Alon Betancourt Order Number: [id number]    Performed at:  [address] [place] [address]  [id number]  : Pedro Woody MD, Phone:  [phone]     Test Name Result Flag Reference   tTG IGA <2 U/mL  0 - 3   Negative        0 -  3                              Weak Positive   4 - 10                              Positive           >10 Tissue Transglutaminase (tTG) has been identified as the endomysial antigen  Studies have demonstr- ated that endomysial IgA antibodies have over 99% specificity for gluten sensitive enteropathy  (1) CELIAC DISEASE AB PROFILE [id number] 07:41AM Alon Betancourt Order Number: [id number]    Performed at:  [address] [place] [address]  [id number]  : Pedro Woody MD, Phone:  [phone]     Test Name Result Flag Reference   tTG IGG <2 U/mL  0 - 5   Negative        0 - 5                              Weak Positive   6 - 9                              Positive           >9   tTG IGA <2 U/mL  0 - 3   Negative        0 -  3                              Weak Positive   4 - 10                              Positive           >10 Tissue Transglutaminase (tTG) has been identified as the endomysial antigen    Studies have demonstr- ated that endomysial IgA antibodies have over 99% specificity for gluten sensitive enteropathy  GLIADA 5 units  0 - 19   Negative                   0 - 19                   Weak Positive             20 - 30                   Moderate to Strong Positive   >30   GLIADG 4 units  0 - 19   Negative                   0 - 19                   Weak Positive             20 - 30                   Moderate to Strong Positive   >30   ENDOMYSIAL AB IGA Negative  Negative    mg/dL  91 - 414   **Please note reference interval change**       Discussion/Summary   ONE OF THE INFLAMMATORY MAKER FREDO AND C-REACTIVE PROTEIN WAS POSITIVE   THESE TESTS ARE VERY NON-SPECIFIC AND IT JUST MEANS YOU HAVE SOME INFLAMMATION IN THE BODY SUCH AS FROM STRESS OR OBESITY CAN CAUSE THIS   MORE SPECIFIC TEST FOR RHEUMATOID ARTHRITIS WAS NEGATIVE    LYME TEST NEGATIVE   BAD CHOLESTEROL HIGH- WATCH SWEETS AND FATTY FOOD   OVERALL LOOKS OKAY   FOLLOW UP WITH RHEUMATOLOGIST AS INSTRUCTED   - DR VALENTINE      Signatures   Electronically signed by : Darrell Dowell MD; Mar 15 2016 10:49AM EST                       (Author)

## 2018-01-18 NOTE — PROCEDURES
Procedures signed  by Denise Contreras DO at 4/21/2016  1:25 PM       Author:  Denise Contreras DO Service:  (none) Author Type:  Physician     Filed:  4/21/2016  1:25 PM Date of Service:  4/21/2016  9:37 AM Status:  Signed     :  Denise Contreras DO (Physician)            Arminda Carey  [id number]  NEUROLOGY REPORT  04/20/2016    Referring Physician  Alivia POTTER  History Of Present Illness  This is a 50year-old, right-hand dominant female with about 1 to 2   years of nocturnal paresthesias in both hands, improved after   awakening and shaking the hands  She has taken it on her own to start   wearing braces with some benefit  There is no associated neck or   shoulder pain  No history of trauma or any real repetitive use of the   hands  A review of systems is significant for a 20-pound weight gain  Past Medical/Surgical History  Denied  Physical Examination  Reflex motor exam is normal  Tinel sign is negative bilaterally  No   sensory deficits  No gross osseous or soft tissue deformity is found   and there is no long tract sign  Electrodiagnostic Findings   Electroneurography: The bilateral median sensory distal latencies were   both prolonged at 4 4 ms on the left and 4 4 ms on the right with   normal amplitudes  The left median motor distal latency is prolonged   at 4 6 ms with a normal amplitude and normal conduction through the   forelimb  However, there is significant signs of conduction block at   the wrist  The right median motor distal latency was prolonged at 4 8   ms with a low amplitude of 2 4 mV and reduced conduction of 44 meters   per second through the forelimb, with stimulation at the palm  The   amplitude improved, which is indicative of significant partial   conduction block   Studies of the right ulnar nerve including motor and   sensory fibers were entirely normal       Electromyography: Monopolar needle exploration failed to reveal any   abnormal spontaneous potentials in the following muscles: Bilateral   cervical paraspinals, bilateral abductor pollicis brevis, right   brachioradialis, right biceps, right triceps, right deltoid  In all   muscles, motor units were normal and recruitment was normal as well   except in both APB muscles where it was diminished  Impression  1  The above electrophysiologic data documents significant focal   involvement of median nerve at both wrists  These electrical findings   support the clinical diagnosis of a moderate, acute carpal tunnel   syndrome at both wrists, right greater than left  2  No electrical evidence is seen today in the nerves and muscles   tested to indicate or suggest a cervical radiculopathy, plexopathy or   polyneuropathy  Recommendation  Careful clinical correlation is advised  With the degree of   abnormality seen on today's study and if the patient fails   conservative measures then surgical referral for further evaluation   and treatment would be warranted    [id number]  D:  04/20/2016 15:05:17  Dictated by:  Ed Dumont DO,   Diplomate, American Board of Electrodiagnostic Medicine    T:  04/21/2016 09:37:29    CC:    Loli Giron MD             Received for:Sergei Dumont DO  Apr 21 2016  1:25PM [facility] Standard Time

## 2018-02-27 ENCOUNTER — TRANSCRIBE ORDERS (OUTPATIENT)
Dept: ADMINISTRATIVE | Facility: HOSPITAL | Age: 58
End: 2018-02-27

## 2018-02-27 DIAGNOSIS — Z12.31 ENCOUNTER FOR SCREENING MAMMOGRAM FOR MALIGNANT NEOPLASM OF BREAST: Primary | ICD-10-CM

## 2018-03-01 ENCOUNTER — HOSPITAL ENCOUNTER (OUTPATIENT)
Dept: RADIOLOGY | Age: 58
Discharge: HOME | End: 2018-03-01
Payer: COMMERCIAL

## 2018-03-01 DIAGNOSIS — Z12.31 ENCOUNTER FOR SCREENING MAMMOGRAM FOR MALIGNANT NEOPLASM OF BREAST: ICD-10-CM

## 2018-03-01 PROCEDURE — 77067 SCR MAMMO BI INCL CAD: CPT

## 2018-05-15 RX ORDER — CYCLOBENZAPRINE HCL 10 MG
1 TABLET ORAL 3 TIMES DAILY PRN
COMMUNITY
Start: 2017-01-17 | End: 2018-05-16 | Stop reason: ALTCHOICE

## 2018-05-15 RX ORDER — CETIRIZINE HYDROCHLORIDE 10 MG/1
1 TABLET ORAL AS NEEDED
COMMUNITY
Start: 2014-06-05

## 2018-05-16 ENCOUNTER — OFFICE VISIT (OUTPATIENT)
Dept: FAMILY MEDICINE CLINIC | Facility: CLINIC | Age: 58
End: 2018-05-16
Payer: COMMERCIAL

## 2018-05-16 VITALS
DIASTOLIC BLOOD PRESSURE: 74 MMHG | WEIGHT: 189.4 LBS | SYSTOLIC BLOOD PRESSURE: 114 MMHG | BODY MASS INDEX: 31.56 KG/M2 | TEMPERATURE: 97.3 F | RESPIRATION RATE: 18 BRPM | HEIGHT: 65 IN | HEART RATE: 80 BPM | OXYGEN SATURATION: 98 %

## 2018-05-16 DIAGNOSIS — E55.9 VITAMIN D DEFICIENCY: Primary | ICD-10-CM

## 2018-05-16 DIAGNOSIS — Z00.00 WELL ADULT EXAM: ICD-10-CM

## 2018-05-16 DIAGNOSIS — Z13.220 LIPID SCREENING: ICD-10-CM

## 2018-05-16 PROCEDURE — 99396 PREV VISIT EST AGE 40-64: CPT | Performed by: FAMILY MEDICINE

## 2018-05-16 NOTE — PROGRESS NOTES
Maylin Acevedo was seen today for physical exam     Diagnoses and all orders for this visit:    Vitamin D deficiency  -     Vitamin D 25 hydroxy    Well adult exam    Lipid screening  -     Comprehensive metabolic panel  -     Lipid Panel with Direct LDL reflex      Patient Counseling:  --Nutrition: Stressed importance of moderation in sodium/caffeine intake, saturated fat and cholesterol, caloric balance, sufficient intake of fresh fruits, vegetables, fiber, calcium, iron, and 1 mg of folate supplement per day   --Discussed  calcium supplement, and the daily use of baby aspirin  --Exercise: Stressed the importance of regular exercise  --Substance Abuse: Discussed cessation/primary prevention of tobacco, alcohol, or other drug use; driving or other dangerous activities under the influence; availability of treatment for abuse  --Sexuality: Discussed sexually transmitted diseases, partner selection, use of condoms, avoidance of unintended pregnancy  and contraceptive alternatives  --Injury prevention: Discussed safety belts, safety helmets, smoke detector, smoking near bedding or upholstery  --Dental health: Discussed importance of regular tooth brushing, flossing, and dental visits  --Immunizations reviewed  --Discussed benefits of screening colonoscopy    Chief Complaint   Patient presents with    Physical Exam     No pain or cmplaints  HPI    Patient here for a comprehensive physical exam The patient reports problems - bilateral thighs pain     Do you take any herbs or supplements that were not prescribed by a doctor? no   Are you taking calcium supplements? yes   Are you taking aspirin daily? no  How often do you exercise? 4 times a week   Diet?  2-3 servings of fruits and vegetables  Dental visit: yearly     Vision test: wears glasses     Colonoscopy:  10/2/2014  Mammogram: 3/2018   History:   LMP: No LMP recorded  Patient is postmenopausal   Last pap date: last year   Abnormal pap? no  : 2  Para: 2      History   Sexual Activity    Sexual activity: No             Review of Systems   Constitutional: Negative  HENT: Negative  Eyes: Negative  Respiratory: Negative  Cardiovascular: Negative  Gastrointestinal: Negative  Endocrine: Negative  Genitourinary: Negative  Musculoskeletal: Negative  Skin: Negative  Allergic/Immunologic: Negative  Neurological: Negative  Hematological: Negative  Psychiatric/Behavioral: Negative  Family History   Problem Relation Age of Onset    Hypothyroidism Mother     Dementia Mother     No Known Problems Father     Breast cancer Sister        Past Medical History:   Diagnosis Date    Known health problems: none          Social History     Social History    Marital status: /Civil Union     Spouse name: N/A    Number of children: N/A    Years of education: N/A     Occupational History    Not on file  Social History Main Topics    Smoking status: Never Smoker    Smokeless tobacco: Never Used    Alcohol use No    Drug use: No    Sexual activity: No     Other Topics Concern    Not on file     Social History Narrative    No narrative on file       Current Outpatient Prescriptions on File Prior to Visit   Medication Sig Dispense Refill    cetirizine (ZyrTEC) 10 mg tablet Take 5 mg by mouth as needed for allergies      escitalopram (LEXAPRO) 10 mg tablet Take 10 mg by mouth daily      fluticasone (VERAMYST) 27 5 MCG/SPRAY nasal spray 2 sprays into each nostril daily      ibuprofen (MOTRIN) 600 mg tablet Take 1 tablet by mouth every 6 (six) hours as needed for mild pain 30 tablet 0    meloxicam (MOBIC) 15 mg tablet Take 15 mg by mouth as needed      [DISCONTINUED] calcium citrate-vitamin D (CITRACAL+D) 315-200 MG-UNIT per tablet Take 1 tablet by mouth daily       No current facility-administered medications on file prior to visit            Allergies   Allergen Reactions    Penicillins Other (See Comments)     Pt developed "crystals", and itchy rash    Amoxicillin Rash and Syncope         Vitals:    05/16/18 1529   BP: 114/74   Pulse: 80   Resp: 18   Temp: (!) 97 3 °F (36 3 °C)   SpO2: 98%   Weight: 85 9 kg (189 lb 6 4 oz)   Height: 5' 5 43" (1 662 m)         Physical Exam   Constitutional: She is oriented to person, place, and time  Vital signs are normal  She appears well-developed and well-nourished  HENT:   Head: Normocephalic and atraumatic  Nose: Nose normal    Mouth/Throat: Oropharynx is clear and moist    Eyes: Pupils are equal, round, and reactive to light  Neck: Normal range of motion  Neck supple  No thyromegaly present  Cardiovascular: Normal rate and regular rhythm  No murmur heard  Pulmonary/Chest: Effort normal and breath sounds normal    Abdominal: Soft  Bowel sounds are normal    Musculoskeletal: Normal range of motion  She exhibits no edema or deformity  Neurological: She is alert and oriented to person, place, and time  She has normal reflexes  Skin: Skin is warm  No rash noted  No erythema  Psychiatric: She has a normal mood and affect   Her behavior is normal

## 2018-06-24 LAB
25(OH)D3 SERPL-MCNC: 38 NG/ML (ref 30–100)
ALBUMIN SERPL-MCNC: 4.3 G/DL (ref 3.6–5.1)
ALBUMIN/GLOB SERPL: 1.5 (CALC) (ref 1–2.5)
ALP SERPL-CCNC: 72 U/L (ref 33–130)
ALT SERPL-CCNC: 16 U/L (ref 6–29)
AST SERPL-CCNC: 17 U/L (ref 10–35)
BILIRUB SERPL-MCNC: 0.6 MG/DL (ref 0.2–1.2)
BUN SERPL-MCNC: 14 MG/DL (ref 7–25)
BUN/CREAT SERPL: NORMAL (CALC) (ref 6–22)
CALCIUM SERPL-MCNC: 9.5 MG/DL (ref 8.6–10.4)
CHLORIDE SERPL-SCNC: 105 MMOL/L (ref 98–110)
CHOLEST SERPL-MCNC: 236 MG/DL
CHOLEST/HDLC SERPL: 3.2 (CALC)
CO2 SERPL-SCNC: 28 MMOL/L (ref 20–31)
CREAT SERPL-MCNC: 0.81 MG/DL (ref 0.5–1.05)
GLOBULIN SER CALC-MCNC: 2.9 G/DL (CALC) (ref 1.9–3.7)
GLUCOSE SERPL-MCNC: 88 MG/DL (ref 65–99)
HDLC SERPL-MCNC: 73 MG/DL
LDLC SERPL CALC-MCNC: 141 MG/DL (CALC)
NONHDLC SERPL-MCNC: 163 MG/DL (CALC)
POTASSIUM SERPL-SCNC: 4.3 MMOL/L (ref 3.5–5.3)
PROT SERPL-MCNC: 7.2 G/DL (ref 6.1–8.1)
SL AMB EGFR AFRICAN AMERICAN: 95 ML/MIN/1.73M2
SL AMB EGFR NON AFRICAN AMERICAN: 82 ML/MIN/1.73M2
SODIUM SERPL-SCNC: 140 MMOL/L (ref 135–146)
TRIGL SERPL-MCNC: 103 MG/DL

## 2018-07-02 ENCOUNTER — OFFICE VISIT (OUTPATIENT)
Dept: FAMILY MEDICINE CLINIC | Facility: CLINIC | Age: 58
End: 2018-07-02
Payer: COMMERCIAL

## 2018-07-02 VITALS
SYSTOLIC BLOOD PRESSURE: 124 MMHG | WEIGHT: 190 LBS | HEIGHT: 65 IN | RESPIRATION RATE: 14 BRPM | BODY MASS INDEX: 31.65 KG/M2 | DIASTOLIC BLOOD PRESSURE: 76 MMHG | HEART RATE: 78 BPM

## 2018-07-02 DIAGNOSIS — J01.00 ACUTE NON-RECURRENT MAXILLARY SINUSITIS: Primary | ICD-10-CM

## 2018-07-02 PROCEDURE — 3008F BODY MASS INDEX DOCD: CPT | Performed by: FAMILY MEDICINE

## 2018-07-02 PROCEDURE — 99213 OFFICE O/P EST LOW 20 MIN: CPT | Performed by: FAMILY MEDICINE

## 2018-07-02 RX ORDER — AZITHROMYCIN 250 MG/1
TABLET, FILM COATED ORAL
Qty: 6 TABLET | Refills: 0 | Status: SHIPPED | OUTPATIENT
Start: 2018-07-02 | End: 2018-07-09

## 2018-07-02 RX ORDER — IPRATROPIUM BROMIDE 21 UG/1
2 SPRAY, METERED NASAL EVERY 12 HOURS
Qty: 30 ML | Refills: 0 | Status: SHIPPED | OUTPATIENT
Start: 2018-07-02 | End: 2019-02-08 | Stop reason: ALTCHOICE

## 2018-07-02 NOTE — PROGRESS NOTES
Assessment/Plan:         Diagnoses and all orders for this visit:    Acute non-recurrent maxillary sinusitis  -     ipratropium (ATROVENT) 0 03 % nasal spray; 2 sprays into each nostril every 12 (twelve) hours  -     azithromycin (ZITHROMAX) 250 mg tablet; 2 tabs x 1 day, 1 tab x 4 days          Subjective:      Patient ID: Nano luz a 56 y o  female  URI    This is a new problem  The current episode started in the past 7 days  The problem has been unchanged  There has been no fever  Associated symptoms include congestion, coughing, joint pain, rhinorrhea and sinus pain  Pertinent negatives include no abdominal pain, chest pain, diarrhea, dysuria, ear pain, headaches, joint swelling, nausea, neck pain, plugged ear sensation, rash, sneezing, sore throat, swollen glands, vomiting or wheezing  She has tried nothing for the symptoms  The treatment provided no relief  The following portions of the patient's history were reviewed and updated as appropriate: allergies, current medications, past family history, past medical history, past social history, past surgical history and problem list     Review of Systems   HENT: Positive for congestion, rhinorrhea and sinus pain  Negative for ear pain, sneezing and sore throat  Respiratory: Positive for cough  Negative for wheezing  Cardiovascular: Negative for chest pain  Gastrointestinal: Negative for abdominal pain, diarrhea, nausea and vomiting  Genitourinary: Negative for dysuria  Musculoskeletal: Positive for joint pain  Negative for neck pain  Skin: Negative for rash  Neurological: Negative for headaches               Past Medical History:   Diagnosis Date    Known health problems: none      Past Surgical History:   Procedure Laterality Date    FL WRIST Margaret Gamma LIG Right 6/2/2017    Procedure: ENDOSCOPIC CARPAL TUNNEL RELEASE ;  Surgeon: Del Dan MD;  Location: [facility];  Service: Orthopedics     Social History Social History    Marital status: /Civil Union     Spouse name: N/A    Number of children: N/A    Years of education: N/A     Occupational History    Not on file       Social History Main Topics    Smoking status: Never Smoker    Smokeless tobacco: Never Used    Alcohol use No    Drug use: No    Sexual activity: No     Other Topics Concern    Not on file     Social History Narrative    No narrative on file     Allergies   Allergen Reactions    Penicillins Other (See Comments)     Pt developed "crystals", and itchy rash    Amoxicillin Rash and Syncope         Family History   Problem Relation Age of Onset    Hypothyroidism Mother     Dementia Mother     No Known Problems Father     Breast cancer Sister            Current Outpatient Prescriptions:     Calcium Carb-Cholecalciferol (CALTRATE 600+D) 600-800 MG-UNIT TABS, Take by mouth, Disp: , Rfl:     cetirizine (ZYRTEC ALLERGY) 10 mg tablet, Take 1 tablet by mouth daily, Disp: , Rfl:     cholecalciferol (VITAMIN D3) 1,000 units tablet, Take 1 tablet by mouth daily, Disp: , Rfl:     escitalopram (LEXAPRO) 10 mg tablet, Take 10 mg by mouth daily, Disp: , Rfl:     fluticasone (VERAMYST) 27 5 MCG/SPRAY nasal spray, 2 sprays into each nostril daily, Disp: , Rfl:     ibuprofen (MOTRIN) 600 mg tablet, Take 1 tablet by mouth every 6 (six) hours as needed for mild pain, Disp: 30 tablet, Rfl: 0    meloxicam (MOBIC) 15 mg tablet, Take 15 mg by mouth as needed, Disp: , Rfl:     azithromycin (ZITHROMAX) 250 mg tablet, 2 tabs x 1 day, 1 tab x 4 days, Disp: 6 tablet, Rfl: 0    ipratropium (ATROVENT) 0 03 % nasal spray, 2 sprays into each nostril every 12 (twelve) hours, Disp: 30 mL, Rfl: 0        Objective:      /76 (BP Location: Left arm, Patient Position: Sitting, Cuff Size: Standard)   Pulse 78   Resp 14   Ht 5' 5" (1 651 m)   Wt 86 2 kg (190 lb)   BMI 31 62 kg/m²          Physical Exam   Constitutional: She appears well-developed and well-nourished  HENT:   Head: Normocephalic  Nose: Right sinus exhibits maxillary sinus tenderness  Eyes: EOM are normal  Pupils are equal, round, and reactive to light  Cardiovascular: Normal rate and regular rhythm      Pulmonary/Chest: Effort normal and breath sounds normal

## 2018-07-29 DIAGNOSIS — J01.00 ACUTE NON-RECURRENT MAXILLARY SINUSITIS: ICD-10-CM

## 2018-07-29 RX ORDER — IPRATROPIUM BROMIDE 21 UG/1
SPRAY, METERED NASAL
Refills: 0 | OUTPATIENT
Start: 2018-07-29

## 2018-08-12 DIAGNOSIS — J01.00 ACUTE NON-RECURRENT MAXILLARY SINUSITIS: ICD-10-CM

## 2018-08-13 RX ORDER — IPRATROPIUM BROMIDE 21 UG/1
SPRAY, METERED NASAL
Refills: 0 | OUTPATIENT
Start: 2018-08-13

## 2018-08-26 DIAGNOSIS — F41.9 ANXIETY: Primary | ICD-10-CM

## 2018-08-27 RX ORDER — ESCITALOPRAM OXALATE 10 MG/1
TABLET ORAL
Qty: 90 TABLET | Refills: 3 | Status: SHIPPED | OUTPATIENT
Start: 2018-08-27 | End: 2019-08-12 | Stop reason: SDUPTHER

## 2018-12-04 ENCOUNTER — TELEPHONE (OUTPATIENT)
Dept: FAMILY MEDICINE CLINIC | Facility: CLINIC | Age: 58
End: 2018-12-04

## 2018-12-04 NOTE — TELEPHONE ENCOUNTER
Before I call patient back I would be taking a same day appointment correct? If she can't do morning tomorrow can I do one of your afternoons?

## 2018-12-04 NOTE — TELEPHONE ENCOUNTER
Patient called and wanted to know if you can squeeze her in after 2pm today possible ear infection left ear   Please advise

## 2018-12-04 NOTE — TELEPHONE ENCOUNTER
Spoke to dr baker, and said said [phone] or [phone] tomorrow if pt couldn't make the [phone] spoke to pt and scheduled for 845

## 2018-12-05 ENCOUNTER — OFFICE VISIT (OUTPATIENT)
Dept: FAMILY MEDICINE CLINIC | Facility: CLINIC | Age: 58
End: 2018-12-05
Payer: COMMERCIAL

## 2018-12-05 VITALS
HEIGHT: 65 IN | DIASTOLIC BLOOD PRESSURE: 82 MMHG | SYSTOLIC BLOOD PRESSURE: 124 MMHG | RESPIRATION RATE: 16 BRPM | HEART RATE: 90 BPM | BODY MASS INDEX: 32.15 KG/M2 | WEIGHT: 193 LBS | TEMPERATURE: 98.4 F | OXYGEN SATURATION: 98 %

## 2018-12-05 DIAGNOSIS — H61.22 IMPACTED CERUMEN OF LEFT EAR: Primary | ICD-10-CM

## 2018-12-05 PROCEDURE — 69209 REMOVE IMPACTED EAR WAX UNI: CPT | Performed by: FAMILY MEDICINE

## 2018-12-05 PROCEDURE — 99213 OFFICE O/P EST LOW 20 MIN: CPT | Performed by: FAMILY MEDICINE

## 2018-12-05 PROCEDURE — 3008F BODY MASS INDEX DOCD: CPT | Performed by: FAMILY MEDICINE

## 2018-12-05 NOTE — PROGRESS NOTES
Assessment/Plan:         Diagnoses and all orders for this visit:    Impacted cerumen of left ear    Other orders  -     Ear cerumen removal          Subjective:      Patient ID: Lan Ferris is a 66 y o  female  Earache    There is pain in the left ear  This is a new problem  The current episode started in the past 7 days  The problem occurs constantly  The problem has been unchanged  There has been no fever  The pain is mild  Associated symptoms include a sore throat  Pertinent negatives include no abdominal pain, coughing, diarrhea, ear discharge, headaches, hearing loss, neck pain, rash, rhinorrhea or vomiting  She has tried NSAIDs for the symptoms  The treatment provided mild relief  There is no history of a chronic ear infection, hearing loss or a tympanostomy tube  The following portions of the patient's history were reviewed and updated as appropriate: allergies, current medications, past family history, past medical history, past social history, past surgical history and problem list     Review of Systems   HENT: Positive for ear pain and sore throat  Negative for ear discharge, hearing loss and rhinorrhea  Respiratory: Negative for cough  Gastrointestinal: Negative for abdominal pain, diarrhea and vomiting  Musculoskeletal: Negative for neck pain  Skin: Negative for rash  Neurological: Negative for headaches  Objective:      /82 (BP Location: Left arm, Patient Position: Sitting, Cuff Size: Standard)   Pulse 90   Temp 98 4 °F (36 9 °C)   Resp 16   Ht 5' 5" (1 651 m)   Wt 87 5 kg (193 lb)   SpO2 98%   BMI 32 12 kg/m²          Physical Exam   Constitutional: She appears well-developed and well-nourished  HENT:   + left cerumen impaction   Cardiovascular: Normal rate and regular rhythm  Pulmonary/Chest: Effort normal and breath sounds normal    Psychiatric: She has a normal mood and affect   Her behavior is normal      Ear cerumen removal  Date/Time: 12/5/2018 9:24 AM  Performed by: RONN VALENTINE  Authorized by: Bria Hoover     Patient location:  Bedside  Other Assisting Provider: No    Consent:     Consent obtained:  Verbal    Consent given by:  Patient    Risks discussed:  Bleeding, infection, dizziness and incomplete removal    Alternatives discussed:  Observation  Procedure details:     Local anesthetic:  None    Location:  L ear    Procedure type: irrigation      Approach:  Internal  Post-procedure details:     Complication:  None    Hearing quality:  Improved    Patient tolerance of procedure:   Tolerated well, no immediate complications

## 2019-01-05 ENCOUNTER — HOSPITAL ENCOUNTER (EMERGENCY)
Facility: HOSPITAL | Age: 59
Discharge: HOME | End: 2019-01-05
Attending: EMERGENCY MEDICINE | Admitting: EMERGENCY MEDICINE
Payer: COMMERCIAL

## 2019-01-05 ENCOUNTER — APPOINTMENT (EMERGENCY)
Dept: CT IMAGING | Facility: HOSPITAL | Age: 59
End: 2019-01-05
Payer: COMMERCIAL

## 2019-01-05 VITALS
BODY MASS INDEX: 34.27 KG/M2 | OXYGEN SATURATION: 96 % | DIASTOLIC BLOOD PRESSURE: 69 MMHG | WEIGHT: 205.91 LBS | RESPIRATION RATE: 18 BRPM | HEART RATE: 76 BPM | SYSTOLIC BLOOD PRESSURE: 114 MMHG | TEMPERATURE: 98.6 F

## 2019-01-05 DIAGNOSIS — R51.9 FACIAL PAIN: Primary | ICD-10-CM

## 2019-01-05 DIAGNOSIS — H57.10 EYE PAIN: ICD-10-CM

## 2019-01-05 PROCEDURE — 96372 THER/PROPH/DIAG INJ SC/IM: CPT

## 2019-01-05 PROCEDURE — 86664 EPSTEIN-BARR NUCLEAR ANTIGEN: CPT | Performed by: PHYSICIAN ASSISTANT

## 2019-01-05 PROCEDURE — 86665 EPSTEIN-BARR CAPSID VCA: CPT | Performed by: PHYSICIAN ASSISTANT

## 2019-01-05 PROCEDURE — 70450 CT HEAD/BRAIN W/O DYE: CPT

## 2019-01-05 PROCEDURE — 86618 LYME DISEASE ANTIBODY: CPT | Performed by: PHYSICIAN ASSISTANT

## 2019-01-05 PROCEDURE — 36415 COLL VENOUS BLD VENIPUNCTURE: CPT | Performed by: PHYSICIAN ASSISTANT

## 2019-01-05 PROCEDURE — 99284 EMERGENCY DEPT VISIT MOD MDM: CPT

## 2019-01-05 PROCEDURE — 86663 EPSTEIN-BARR ANTIBODY: CPT | Performed by: PHYSICIAN ASSISTANT

## 2019-01-05 RX ORDER — PREDNISONE 20 MG/1
60 TABLET ORAL ONCE
Status: COMPLETED | OUTPATIENT
Start: 2019-01-05 | End: 2019-01-05

## 2019-01-05 RX ORDER — KETOROLAC TROMETHAMINE 30 MG/ML
30 INJECTION, SOLUTION INTRAMUSCULAR; INTRAVENOUS ONCE
Status: COMPLETED | OUTPATIENT
Start: 2019-01-05 | End: 2019-01-05

## 2019-01-05 RX ORDER — KETOROLAC TROMETHAMINE 30 MG/ML
30 INJECTION, SOLUTION INTRAMUSCULAR; INTRAVENOUS ONCE
Status: DISCONTINUED | OUTPATIENT
Start: 2019-01-05 | End: 2019-01-05

## 2019-01-05 RX ORDER — PREDNISONE 10 MG/1
10 TABLET ORAL
Qty: 42 TABLET | Refills: 0 | Status: SHIPPED | OUTPATIENT
Start: 2019-01-05 | End: 2019-02-08 | Stop reason: ALTCHOICE

## 2019-01-05 RX ADMIN — PREDNISONE 60 MG: 20 TABLET ORAL at 16:06

## 2019-01-05 RX ADMIN — KETOROLAC TROMETHAMINE 30 MG: 30 INJECTION, SOLUTION INTRAMUSCULAR at 16:06

## 2019-01-05 NOTE — DISCHARGE INSTRUCTIONS
Bryant Palsy   WHAT YOU NEED TO KNOW:   Bell palsy is a sudden weakness or paralysis of one side of your face  Bell palsy occurs when the nerve that controls the muscles in your face becomes swollen or irritated  DISCHARGE INSTRUCTIONS:   Medicines:   · Medicine may be given  to decrease swelling and irritation of your facial nerve  You may receive antiviral medicine if your healthcare provider thinks a virus caused your Bell palsy  Your healthcare provider may also suggest acetaminophen or ibuprofen to reduce pain  These medicines are available without a doctor's order  Ask which medicine to take, and how much you need  Follow directions  Acetaminophen can cause liver damage, and ibuprofen can cause stomach bleeding or kidney damage  · Take your medicine as directed  Contact your healthcare provider if you think your medicine is not helping or if you have side effects  Tell him if you are allergic to any medicine  Keep a list of the medicines, vitamins, and herbs you take  Include the amounts, and when and why you take them  Bring the list or the pill bottles to follow-up visits  Carry your medicine list with you in case of an emergency  Follow up with your healthcare provider as directed:  Write down your questions so you remember to ask them during your visits  Eye care: Your healthcare provider may recommend that you use artificial tears during the day to keep your eye moist  You may need to use an eye ointment at night  You may also need to wear a patch over your eye and tape it shut while you sleep  This helps keep your eye from getting dry and infected  Wear sunglasses to protect your eye from direct sunlight  Stay away from places that have fumes, dust, or other particles in the air that may harm your eye  Physical therapy:  A physical therapist may teach you how to massage your face and exercise the nerves and muscles in your face   This may help you get better sooner and prevent long-term problems  You can exercise on your own when your facial movement begins to return  Open and close your eye, wink, and smile wide  Do the exercises for 15 or 20 minutes several times a day  Contact your healthcare provider if:   · You have a fever  · Your eye becomes red, irritated, or painful  · You have questions or concerns about your condition or care  Return to the emergency department if:   · You develop weakness or numbness on one side of your body (other than your face)  · You have double vision, or you lose vision in your eye  · You have trouble thinking clearly  © 2017 [address] Information is for End User's use only and may not be sold, redistributed or otherwise used for commercial purposes  All illustrations and images included in CareNotes® are the copyrighted property of [employer]  or [employer]  The above information is an  only  It is not intended as medical advice for individual conditions or treatments  Talk to your doctor, nurse or pharmacist before following any medical regimen to see if it is safe and effective for you  Atypical Facial Pain   WHAT YOU NEED TO KNOW:   Atypical facial pain usually occurs on one side of your face  The pain is often constant, and may be aching, burning, throbbing, or stabbing  The pain may be felt in your nose, eye, cheek, temple, and jaw  You may also have headaches  DISCHARGE INSTRUCTIONS:   Contact your healthcare provider if:   · Your symptoms get worse, or you develop new symptoms  · You have questions or concerns about your condition or care  Medicines:   · Medicines  such as antidepressants, antiseizure medicines, or muscle relaxers may be used to decrease pain  · Take your medicine as directed  Contact your healthcare provider if you think your medicine is not helping or if you have side effects  Tell him of her if you are allergic to any medicine   Keep a list of the medicines, vitamins, and herbs you take  Include the amounts, and when and why you take them  Bring the list or the pill bottles to follow-up visits  Carry your medicine list with you in case of an emergency  Follow up with your healthcare provider as directed:  Write down your questions so you remember to ask them during your visits  © 2017 [address] Information is for End User's use only and may not be sold, redistributed or otherwise used for commercial purposes  All illustrations and images included in CareNotes® are the copyrighted property of [employer]  or [employer]  The above information is an  only  It is not intended as medical advice for individual conditions or treatments  Talk to your doctor, nurse or pharmacist before following any medical regimen to see if it is safe and effective for you

## 2019-01-05 NOTE — ED PROVIDER NOTES
History  Chief Complaint   Patient presents with    Jaw Pain     Pt c/o left sided jaw pain starting Thurs  Also c/o left eye pain, has appt w/ opthamology next week  Kelsey Rocha is a 66 y o  female who presents to the ED with complaints of left-sided facial/jaw pain x 2 days  Patient states facial pain is intermittent and improved with facial movements  Patient state she feels like her jaw is "crooked " Patient states she has also noticed dry/painful eyes (left eye > right eye) and numbness to the right face  Spouse noted mild facial drooping from the right side of the face  Patient states she is also having painful swallowing  Patient states she was seen at urgent care and told she may have bell's palsy  Patient has been using OTC clear eye drops without relief of dry eye  Patient has been taking Advil with some relief of pain  Patient has an an appointment with opthalmology on Monday  Patient states last week she thought she had a sinus infection but did have ear wax removed from her ears  Denies changes in vision, eye redness, eye discharge, pain with EOM, dysphagia, trismus, drooling, tooth pain, neck pain, neck stiffness, lethargy, cough, nasal congestion, rhinorrhea, weakness, nausea, vomiting, headache, dizziness, dysarthria, syncope, chest pain, SOB, fever, chills  No previous hx of TIA/CVA  No previous hx of HTN/HLD/Atrial Fibrillation  Denies sick contacts  History provided by:  Patient and spouse      Prior to Admission Medications   Prescriptions Last Dose Informant Patient Reported? Taking?    Calcium Carb-Cholecalciferol (CALTRATE 600+D) 600-800 MG-UNIT TABS   Yes No   Sig: Take by mouth   cetirizine (ZYRTEC ALLERGY) 10 mg tablet   Yes No   Sig: Take 1 tablet by mouth daily   cholecalciferol (VITAMIN D3) 1,000 units tablet   Yes No   Sig: Take 1 tablet by mouth daily   escitalopram (LEXAPRO) 10 mg tablet   No No   Sig: TAKE 1 TABLET BY MOUTH EVERY DAY   fluticasone (VERAMYST) 27 5 MCG/SPRAY nasal spray   Yes No   Si sprays into each nostril daily   ibuprofen (MOTRIN) 600 mg tablet   No No   Sig: Take 1 tablet by mouth every 6 (six) hours as needed for mild pain   ipratropium (ATROVENT) 0 03 % nasal spray   No No   Si sprays into each nostril every 12 (twelve) hours   meloxicam (MOBIC) 15 mg tablet   Yes No   Sig: Take 15 mg by mouth as needed      Facility-Administered Medications: None       Past Medical History:   Diagnosis Date    Known health problems: none     Neoplasm of ovary     Bilateral       Past Surgical History:   Procedure Laterality Date    CHOLECYSTECTOMY      MO WRIST Mace Hesselbach LIG Right 2017    Procedure: ENDOSCOPIC CARPAL TUNNEL RELEASE ;  Surgeon: Bohdan Rinne, MD;  Location: [facility];  Service: Orthopedics       Family History   Problem Relation Age of Onset    Hypothyroidism Mother     Dementia Mother     Aortic aneurysm Mother         Abdominal    Arthritis Mother     Diverticulitis Mother     Pancreatitis Mother     Other Mother         Tachycardia    No Known Problems Father     Breast cancer Sister     Other Sister         Neoplasm of the brain, Nontropical (celiac) sprue    Diabetes Paternal Grandmother      I have reviewed and agree with the history as documented  Social History   Substance Use Topics    Smoking status: Never Smoker    Smokeless tobacco: Never Used    Alcohol use No      Comment: Per [employer]:  Alcohol use (1/mo)        Review of Systems   Constitutional: Negative for appetite change, chills, fever and unexpected weight change  HENT: Negative for congestion, drooling, ear pain, rhinorrhea, sore throat, trouble swallowing and voice change  Eyes: Positive for pain  Negative for photophobia, discharge, redness, itching and visual disturbance  Respiratory: Negative for cough, shortness of breath, wheezing and stridor  Cardiovascular: Negative for chest pain, palpitations and leg swelling  Gastrointestinal: Negative for abdominal pain, blood in stool, constipation, diarrhea, nausea and vomiting  Genitourinary: Negative for dysuria, flank pain, frequency, hematuria and urgency  Musculoskeletal: Negative for gait problem, joint swelling, neck pain and neck stiffness  Skin: Negative for color change and rash  Neurological: Positive for facial asymmetry and numbness  Negative for dizziness, tremors, seizures, syncope, speech difficulty, weakness, light-headedness and headaches  Physical Exam  Physical Exam   Constitutional: She is oriented to person, place, and time  Vital signs are normal  She appears well-developed and well-nourished  She is cooperative  Non-toxic appearance  No distress  Speaking in full sentences, comprehensible, no acute distress   HENT:   Head: Normocephalic and atraumatic  Right Ear: Hearing, tympanic membrane and external ear normal    Left Ear: Hearing, tympanic membrane, external ear and ear canal normal    Nose: Nose normal    Mouth/Throat: Uvula is midline, oropharynx is clear and moist and mucous membranes are normal  There is trismus in the jaw  No uvula swelling  Erythema and edemea noted to the external auditory canal, TTP of the tragus  TTP of the TMJ and lateral left face, no erythema or edema  Comparable right facial droop noted but improved with facial muscle movement  Eyes: Pupils are equal, round, and reactive to light  Conjunctivae, EOM and lids are normal    Neck: Trachea normal, normal range of motion, full passive range of motion without pain and phonation normal  Neck supple  Cardiovascular: Normal rate, regular rhythm, intact distal pulses and normal pulses  Pulmonary/Chest: Effort normal and breath sounds normal    Musculoskeletal: Normal range of motion  Neurological: She is alert and oriented to person, place, and time  She has normal strength and normal reflexes  No cranial nerve deficit or sensory deficit   She displays a negative Romberg sign  GCS eye subscore is 4  GCS verbal subscore is 5  GCS motor subscore is 6  Alert and oriented to person, place, and time  PERRL and 3 mm symmetrical, EOMI with no evidence of nystagmus  Visual fields were intact to confrontation  Visual pursuits were smooth with normal saccadic eye movements  Facial sensation intact b/l with no evidence of facial asymmetry  Hearing was normal b/l and the tongue and palate were in midline  SCM and upper trapezius strength normal b/l  Normal muscle tone, muscle strength testing revealed 5/5 for both upper and lower extremities   DTRs were 2+ both upper and lower, plantar reflex was flexor b/l   No evidence of postural or action tremor, No dysmetria seen on FTN testing   No evidence of sensory deficits    Skin: Skin is warm and dry  Capillary refill takes less than 2 seconds  Psychiatric: She has a normal mood and affect  Nursing note and vitals reviewed  Vital Signs  ED Triage Vitals [01/05/19 1256]   Temperature Pulse Respirations Blood Pressure SpO2   98 6 °F (37 °C) 74 16 145/76 97 %      Temp Source Heart Rate Source Patient Position - Orthostatic VS BP Location FiO2 (%)   Oral Monitor Sitting Left arm --      Pain Score       Worst Possible Pain           Vitals:    01/05/19 1256 01/05/19 1542 01/05/19 1545   BP: 145/76 114/69 114/69   Pulse: 74 77 76   Patient Position - Orthostatic VS: Sitting Sitting - Orthostatic VS        Visual Acuity      ED Medications  Medications   ketorolac (TORADOL) injection 30 mg (30 mg Intramuscular Given 1/5/19 1606)   predniSONE tablet 60 mg (60 mg Oral Given 1/5/19 1606)       Diagnostic Studies  Results Reviewed     Procedure Component Value Units Date/Time    EBV acute panel [[id number]] Collected:  01/05/19 1446    Lab Status: In process Specimen:  Blood from Arm, Left Updated:  01/05/19 1453    Lyme Antibody Profile with reflex to [facility] [[id number]] Collected:  01/05/19 1446    Lab Status:   In process Specimen: Blood from Arm, Left Updated:  01/05/19 1453                 CT head without contrast   Final Result by Jesse Singh MD (01/05 8104)      No acute intracranial abnormality  Workstation performed: [id number]                    Procedures  Procedures       Phone Contacts  ED Phone Contact    ED Course  ED Course as of Jan 05 1802   Sat Jan 05, 2019   1445 Will give IM Toradol for pain if CT negative  Stroke Assessment     Row Name 01/05/19 1430             NIH Stroke Scale    Interval Baseline      Level of Consciousness (1a ) 0      LOC Questions (1b ) 0      LOC Commands (1c ) 0      Best Gaze (2 ) 0      Visual (3 ) 0      Facial Palsy (4 ) 0      Motor Arm, Left (5a ) 0      Motor Arm, Right (5b ) 0      Motor Leg, Left (6a ) 0      Motor Leg, Right (6b ) 0      Limb Ataxia (7 ) 0      Sensory (8 ) 0      Best Language (9 ) 0      Dysarthria (10 ) 0      Extinction and Inattention (11 ) (Formerly Neglect) 0      Total 0                          MDM  Number of Diagnoses or Management Options  Eye pain: new and does not require workup  Facial pain: new and does not require workup  Diagnosis management comments: John Damico is a 66 y o  female who presents to the ED with complaints of left-sided facial/jaw pain x 2 days  Patient states facial pain is intermittent and improved with facial movements  Patient state she feels like her jaw is "crooked " Patient states she has also noticed dry/painful eyes (left eye > right eye) and numbness to the right face  Spouse noted mild facial drooping from the right side of the face  Patient states she is also having painful swallowing  Patient states she was seen at urgent care and told she may have bell's palsy  Patient has been using OTC clear eye drops without relief of dry eye  Patient has been taking Advil with some relief of pain  Patient has an an appointment with opthalmology on Monday  NIH scale = 0 upon initial evaluation   PE revealed TTP of the TMJ and lateral left jaw line, mild trismus noted, erythema and edema of the left ear canal, mild facial droop of the right side which improved with facial movement  CT head WNL  EBV and Lyme disease drawn for possible Bell's Palsy  Differential diagnosis included but not limited to: TMJ disorder, otitis externa, bell's palsy, facial pain of unknown etiology  Pain improved after IM Toradol  Will start on Prednisone taper at this time  Educated patient regarding diagnosis and management  Advised patient to follow up with PCP  Advised patient to RTER for persistent or worsening symptoms  Patient Progress  Patient progress: improved    CritCare Time    Disposition  Final diagnoses:   Facial pain   Eye pain     Time reflects when diagnosis was documented in both MDM as applicable and the Disposition within this note     Time User Action Codes Description Comment    1/5/2019  3:54 PM Calixto Dire Add [R51] Facial pain     1/5/2019  3:54 PM Calixto Dire Add [H57 10] Eye pain       ED Disposition     ED Disposition Condition Comment    Discharge  Kiesha Liadis discharge to home/self care      Condition at discharge: Good        Follow-up Information     Follow up With Specialties Details Why Contact Info Additional [address] Emergency Department Emergency Medicine Go to If symptoms worsen [address] [facility]  [facility] ED, [address] [facility], [place], [place], [address] MD Family Medicine Schedule an appointment as soon as possible for a visit  [address] [place] [address] 15  [phone]   [phone]             Discharge Medication List as of 1/5/2019  3:56 PM      START taking these medications    Details   glycerin-hypromellose- (ARTIFICIAL TEARS) 0 2-0 2-1 % SOLN Administer 2 drops to both eyes every 4 (four) hours as needed (Dry Eye), Starting Sat 1/5/2019, Print predniSONE 10 mg tablet Take 1 tablet (10 mg total) by mouth daily with breakfast 6 tabs PO qDaily x2 days, 5 tabs PO qD x 2d, 4 tabs PO qD x 2d,   3 tabs PO qD x 2d, 2 tabs PO qD x 2d, 1 tab PO qD x 2d, Starting Sat 1/5/2019, Print         CONTINUE these medications which have NOT CHANGED    Details   Calcium Carb-Cholecalciferol (CALTRATE 600+D) 600-800 MG-UNIT TABS Take by mouth, Starting Thu 6/5/2014, Historical Med      cetirizine (ZYRTEC ALLERGY) 10 mg tablet Take 1 tablet by mouth daily, Starting Thu 6/5/2014, Historical Med      cholecalciferol (VITAMIN D3) 1,000 units tablet Take 1 tablet by mouth daily, Starting Wed 4/23/2014, Historical Med      escitalopram (LEXAPRO) 10 mg tablet TAKE 1 TABLET BY MOUTH EVERY DAY, Normal      fluticasone (VERAMYST) 27 5 MCG/SPRAY nasal spray 2 sprays into each nostril daily, Until Discontinued, Historical Med      ibuprofen (MOTRIN) 600 mg tablet Take 1 tablet by mouth every 6 (six) hours as needed for mild pain, Starting 6/2/2017, Until Discontinued, Print      ipratropium (ATROVENT) 0 03 % nasal spray 2 sprays into each nostril every 12 (twelve) hours, Starting Mon 7/2/2018, Normal      meloxicam (MOBIC) 15 mg tablet Take 15 mg by mouth as needed, Until Discontinued, Historical Med           No discharge procedures on file      ED Provider  Electronically Signed by           Go Schultz PA-C  01/05/19 5273

## 2019-01-07 ENCOUNTER — VBI (OUTPATIENT)
Dept: ADMINISTRATIVE | Facility: OTHER | Age: 59
End: 2019-01-07

## 2019-01-07 LAB
B BURGDOR IGG SER IA-ACNC: 0.14
B BURGDOR IGM SER IA-ACNC: 0.21

## 2019-01-07 NOTE — TELEPHONE ENCOUNTER
Андрей Little    ED Visit Information     Ed visit date:1/5/19  Diagnosis Description: Facial pain; Eye pain  In Network? Yes [facility]  Discharge status: Home  Discharged with meds ? Yes  Number of ED visits to date: 1  ED Severity:4     Outreach Information    Outreach successful: Yes 1  Date letter mailed:0  Date Finalized:1/7/19    Care Coordination    Follow up appointment with pcp: no declined  Transportation issues ? No    Value [employer] type:  3 Day Outreach  Emergent necessity warranted by diagnosis:  Yes  [facility] PCP:  Yes  Transportation:  Friend/Family Transport  Called PCP first?:  No  Feels able to call PCP for urgent problems ?:  Yes  Understands what emergencies can be handled by PCP ?:  Yes  Ever any problems getting appointment with PCP for minor emergency/urgency problems?:  No  Practice Contacted Patient ?:  No  Pt had ED follow up with pcp/staff ?:  No    Reason Patient went to ED instead of Urgent Care or PCP?:  Proximity  Urgent care Education?:  No  Spoke to [place] today she stated she is feeling better the medications seem to be helping, pt declined follow up at this point  Pt went to an Urgent care first located in [place], she was not aware of [facility]  Urgent care nearest to her- [place] [id number]  The Urgent care sent her to the Ed for further follow up - CT scan

## 2019-01-09 LAB
EBV EA IGG SER-ACNC: 13.3 U/ML (ref 0–8.9)
EBV NA IGG SER IA-ACNC: 69.2 U/ML (ref 0–17.9)
EBV PATRN SPEC IB-IMP: ABNORMAL
EBV VCA IGG SER IA-ACNC: 371 U/ML (ref 0–17.9)
EBV VCA IGM SER IA-ACNC: <36 U/ML (ref 0–35.9)

## 2019-02-08 ENCOUNTER — OFFICE VISIT (OUTPATIENT)
Dept: FAMILY MEDICINE CLINIC | Facility: CLINIC | Age: 59
End: 2019-02-08
Payer: COMMERCIAL

## 2019-02-08 VITALS
BODY MASS INDEX: 32.65 KG/M2 | HEIGHT: 65 IN | WEIGHT: 196 LBS | DIASTOLIC BLOOD PRESSURE: 72 MMHG | TEMPERATURE: 98 F | RESPIRATION RATE: 16 BRPM | OXYGEN SATURATION: 98 % | HEART RATE: 67 BPM | SYSTOLIC BLOOD PRESSURE: 116 MMHG

## 2019-02-08 DIAGNOSIS — M25.50 ARTHRALGIA OF MULTIPLE JOINTS: Primary | ICD-10-CM

## 2019-02-08 DIAGNOSIS — E55.9 VITAMIN D DEFICIENCY: ICD-10-CM

## 2019-02-08 DIAGNOSIS — H57.13 EYE PAIN, BILATERAL: ICD-10-CM

## 2019-02-08 PROCEDURE — 99214 OFFICE O/P EST MOD 30 MIN: CPT | Performed by: FAMILY MEDICINE

## 2019-02-08 PROCEDURE — 3008F BODY MASS INDEX DOCD: CPT | Performed by: FAMILY MEDICINE

## 2019-02-08 RX ORDER — MELOXICAM 15 MG/1
15 TABLET ORAL DAILY
Qty: 30 TABLET | Refills: 3 | Status: SHIPPED | OUTPATIENT
Start: 2019-02-08 | End: 2020-02-28

## 2019-02-08 NOTE — PROGRESS NOTES
Assessment/Plan:         Diagnoses and all orders for this visit:    Arthralgia of multiple joints  -     FREDO Screen w/ Reflex to Titer/Pattern; Future  -     Cyclic citrul peptide antibody, IgG; Future  -     Sedimentation rate, automated; Future  -     C-reactive protein; Future  -     Vitamin D 25 hydroxy; Future  -     Vitamin B12; Future  -     Sjogren's Antibodies  -     meloxicam (MOBIC) 15 mg tablet; Take 1 tablet (15 mg total) by mouth daily  -     Ambulatory referral to Rheumatology    Vitamin D deficiency    Eye pain, bilateral      to see an eye specialist  To get blood work done as scheduled  To start clean diet to help with inflammation   Spent 25 minutes with the patient and more than 50% was spent counseling       Subjective:      Patient ID: Violet Morton is a 66 y o  female  Arthritis   Presents for follow-up visit  She complains of pain and joint swelling  The symptoms have been stable  Affected location: bilateral legs and back  Her pain is at a severity of 2/10  Associated symptoms include fatigue  Pertinent negatives include no diarrhea, dry eyes, dry mouth, pain at night, pain while resting, rash or Raynaud's syndrome  Compliance with medications is %  Here for hospital follow up  Was in ER on 1/5/19 for left sided jaw pain and possible Bell's palsy  Negative lyme test   Was treated prednisone and NSAIDs which helped with symptoms   Continued to have left sided jaw pain and bilateral eye pain   Didn't see the eye doctor yet     The following portions of the patient's history were reviewed and updated as appropriate: allergies, current medications, past family history, past medical history, past social history, past surgical history and problem list     Review of Systems   Constitutional: Positive for fatigue  HENT: Positive for facial swelling  Eyes: Positive for pain  Respiratory: Negative  Cardiovascular: Negative  Gastrointestinal: Negative    Negative for diarrhea  Endocrine: Negative  Musculoskeletal: Positive for arthralgias, arthritis, joint swelling and myalgias  Skin: Negative for rash  Allergic/Immunologic: Negative  Neurological: Negative  Hematological: Negative  Psychiatric/Behavioral: Negative  Objective:      /72 (BP Location: Left arm, Patient Position: Sitting, Cuff Size: Standard)   Pulse 67   Temp 98 °F (36 7 °C)   Resp 16   Ht 5' 5" (1 651 m)   Wt 88 9 kg (196 lb)   SpO2 98%   BMI 32 62 kg/m²          Physical Exam   Constitutional: She is oriented to person, place, and time  She appears well-developed and well-nourished  HENT:   Head: Normocephalic and atraumatic  Eyes: Pupils are equal, round, and reactive to light  EOM are normal    Neck: Normal range of motion  Neck supple  No tracheal deviation present  No thyromegaly present  Cardiovascular: Normal rate and regular rhythm  Pulmonary/Chest: Effort normal    Musculoskeletal: She exhibits no edema or tenderness  Neurological: She is alert and oriented to person, place, and time  No cranial nerve deficit  Skin: No rash noted  No erythema  Psychiatric: She has a normal mood and affect   Her behavior is normal

## 2019-02-21 LAB
25(OH)D3 SERPL-MCNC: 40 NG/ML (ref 30–100)
CRP SERPL-MCNC: 4.9 MG/L
ENA SS-A AB SER IA-ACNC: NORMAL AI
ENA SS-B AB SER IA-ACNC: NORMAL AI
ERYTHROCYTE [SEDIMENTATION RATE] IN BLOOD BY WESTERGREN METHOD: 17 MM/H
VIT B12 SERPL-MCNC: 613 PG/ML (ref 200–1100)

## 2019-02-22 ENCOUNTER — TELEPHONE (OUTPATIENT)
Dept: FAMILY MEDICINE CLINIC | Facility: CLINIC | Age: 59
End: 2019-02-22

## 2019-02-22 NOTE — TELEPHONE ENCOUNTER
Patient called back and given results, she stated the Cyclic citrul peptide antibody, IgG was not covered by her insurance and would be about $130 and wanted to know if you absolutely wanted her to get it done or if it's ok that she does not get it   Please advise

## 2019-03-15 ENCOUNTER — HOSPITAL ENCOUNTER (OUTPATIENT)
Dept: RADIOLOGY | Age: 59
Discharge: HOME | End: 2019-03-15
Payer: COMMERCIAL

## 2019-03-15 VITALS — HEIGHT: 65 IN | BODY MASS INDEX: 31.65 KG/M2 | WEIGHT: 190 LBS

## 2019-03-15 DIAGNOSIS — Z12.31 ENCOUNTER FOR SCREENING MAMMOGRAM FOR MALIGNANT NEOPLASM OF BREAST: ICD-10-CM

## 2019-03-15 PROCEDURE — 77067 SCR MAMMO BI INCL CAD: CPT

## 2019-04-18 ENCOUNTER — TRANSCRIBE ORDERS (OUTPATIENT)
Dept: RADIOLOGY | Facility: HOSPITAL | Age: 59
End: 2019-04-18

## 2019-04-18 ENCOUNTER — HOSPITAL ENCOUNTER (OUTPATIENT)
Dept: RADIOLOGY | Facility: HOSPITAL | Age: 59
Discharge: HOME | End: 2019-04-18
Attending: INTERNAL MEDICINE
Payer: COMMERCIAL

## 2019-04-18 DIAGNOSIS — M25.50 PAIN IN JOINT, MULTIPLE SITES: Primary | ICD-10-CM

## 2019-04-18 DIAGNOSIS — M25.50 PAIN IN JOINT, MULTIPLE SITES: ICD-10-CM

## 2019-04-18 PROCEDURE — 72040 X-RAY EXAM NECK SPINE 2-3 VW: CPT

## 2019-07-12 ENCOUNTER — OFFICE VISIT (OUTPATIENT)
Dept: FAMILY MEDICINE CLINIC | Facility: CLINIC | Age: 59
End: 2019-07-12
Payer: COMMERCIAL

## 2019-07-12 VITALS
RESPIRATION RATE: 16 BRPM | HEIGHT: 64 IN | TEMPERATURE: 96.3 F | DIASTOLIC BLOOD PRESSURE: 80 MMHG | SYSTOLIC BLOOD PRESSURE: 130 MMHG | HEART RATE: 71 BPM | WEIGHT: 195.4 LBS | BODY MASS INDEX: 33.36 KG/M2 | OXYGEN SATURATION: 97 %

## 2019-07-12 DIAGNOSIS — Z00.00 ANNUAL PHYSICAL EXAM: Primary | ICD-10-CM

## 2019-07-12 DIAGNOSIS — M35.9 CONNECTIVE TISSUE DISEASE (HCC): ICD-10-CM

## 2019-07-12 DIAGNOSIS — Z11.59 NEED FOR HEPATITIS C SCREENING TEST: ICD-10-CM

## 2019-07-12 DIAGNOSIS — Z23 NEED FOR DIPHTHERIA-TETANUS-PERTUSSIS (TDAP) VACCINE: ICD-10-CM

## 2019-07-12 PROCEDURE — 99396 PREV VISIT EST AGE 40-64: CPT | Performed by: FAMILY MEDICINE

## 2019-07-12 PROCEDURE — 90715 TDAP VACCINE 7 YRS/> IM: CPT

## 2019-07-12 PROCEDURE — 90471 IMMUNIZATION ADMIN: CPT

## 2019-07-12 NOTE — PATIENT INSTRUCTIONS

## 2019-07-12 NOTE — PROGRESS NOTES
ADULT ANNUAL PHYSICAL  [facility] - [facility] PRACTICE    NAME: Kiesha Boykin  AGE: 66 y o  SEX: female  : 1960     DATE: 2019     Assessment and Plan:     Problem List Items Addressed This Visit        Other    Connective tissue disease ([address] )      Other Visit Diagnoses     Annual physical exam    -  Primary    Relevant Orders    Lipid panel    Need for hepatitis C screening test        Relevant Orders    Hepatitis C antibody    Need for diphtheria-tetanus-pertussis (Tdap) vaccine        Relevant Orders    TDAP VACCINE GREATER THAN OR EQUAL TO 8YO IM          Immunizations and preventive care screenings were discussed with patient today  Appropriate education was printed on patient's after visit summary  Counseling:  BMI Counseling: Body mass index is 33 03 kg/m²  Discussed the patient's BMI with her  The BMI is above average  BMI counseling and education was provided to the patient  Nutrition recommendations include decreasing overall calorie intake, 3-5 servings of fruits/vegetables daily, reducing fast food intake and moderation in carbohydrate intake  Exercise recommendations include moderate aerobic physical activity for 150 minutes/week  Alcohol/drug use: discussed moderation in alcohol intake and avoidance of illicit drug use  Dental Health: discussed importance of regular tooth brushing, flossing, and dental visits  Injury prevention: discussed safety/seat belts, safety helmets, smoke detectors, carbon dioxide detectors, and smoking near bedding or upholstery  · Sexual health: discussed sexually transmitted diseases, partner selection, use of condoms, avoidance of unintended pregnancy, and contraceptive alternatives  No follow-ups on file       Chief Complaint:     Chief Complaint   Patient presents with    Annual Exam     Patient here for annual wellness visit      History of Present Illness:     Adult Annual Physical   Patient here for a comprehensive physical exam  The patient reports no problems  Diet and Physical Activity  · Diet/Nutrition: consuming 3-5 servings of fruits/vegetables daily and limited fruits/vegetables  · Exercise: walking  Depression Screening  PHQ-9 Depression Screening    PHQ-9:    Frequency of the following problems over the past two weeks:       Little interest or pleasure in doing things:  0 - not at all  Feeling down, depressed, or hopeless:  0 - not at all  PHQ-2 Score:  0       General Health  · Sleep: sleeps well  · Hearing: normal - bilateral   · Vision: goes for regular eye exams and most recent eye exam <1 year ago  · Dental: regular dental visits and brushes teeth twice daily  /GYN Health  · Patient is: postmenopausal  · Last menstrual period:   · Contraceptive method: none  Review of Systems:     Review of Systems   Constitutional: Negative  HENT: Negative  Eyes: Negative  Respiratory: Negative  Cardiovascular: Negative  Gastrointestinal: Negative  Endocrine: Negative  Genitourinary: Negative  Musculoskeletal: Negative  Skin: Negative  Allergic/Immunologic: Negative  Neurological: Negative  Hematological: Negative  Psychiatric/Behavioral: Negative         Past Medical History:     Past Medical History:   Diagnosis Date    Known health problems: none     Neoplasm of ovary     Bilateral      Past Surgical History:     Past Surgical History:   Procedure Laterality Date    CHOLECYSTECTOMY      IL WRIST Edmonia Bailey LIG Right 6/2/2017    Procedure: ENDOSCOPIC CARPAL TUNNEL RELEASE ;  Surgeon: Walter Rhoades MD;  Location: [facility];  Service: Orthopedics      Social History:     Social History     Socioeconomic History    Marital status: /Civil Union     Spouse name: None    Number of children: None    Years of education: None    Highest education level: None   Occupational History    Occupation: [employer]  Financial resource strain: None    Food insecurity:     Worry: None     Inability: None    Transportation needs:     Medical: None     Non-medical: None   Tobacco Use    Smoking status: Never Smoker    Smokeless tobacco: Never Used   Substance and Sexual Activity    Alcohol use: No     Comment: Per [employer]:  Alcohol use (1/mo)    Drug use: No    Sexual activity: Never   Lifestyle    Physical activity:     Days per week: None     Minutes per session: None    Stress: None   Relationships    Social connections:     Talks on phone: None     Gets together: None     Attends Yazidi service: None     Active member of club or organization: None     Attends meetings of clubs or organizations: None     Relationship status: None    Intimate partner violence:     Fear of current or ex partner: None     Emotionally abused: None     Physically abused: None     Forced sexual activity: None   Other Topics Concern    None   Social History Narrative    Exercise habits:  1x week for 45 min    Living situations:   With , son, daughter, dog      Family History:     Family History   Problem Relation Age of Onset    Hypothyroidism Mother     Dementia Mother     Aortic aneurysm Mother         Abdominal    Arthritis Mother     Diverticulitis Mother     Pancreatitis Mother     Other Mother         Tachycardia    No Known Problems Father     Breast cancer Sister 64    Other Sister         Neoplasm of the brain, Nontropical (celiac) sprue    Diabetes Paternal Grandmother       Current Medications:     Current Outpatient Medications   Medication Sig Dispense Refill    Calcium Carb-Cholecalciferol (CALTRATE 600+D) 600-800 MG-UNIT TABS Take by mouth      cetirizine (ZYRTEC ALLERGY) 10 mg tablet Take 1 tablet by mouth daily      cholecalciferol (VITAMIN D3) 1,000 units tablet Take 1 tablet by mouth daily      escitalopram (LEXAPRO) 10 mg tablet TAKE 1 TABLET BY MOUTH EVERY DAY 90 tablet 3    glycerin-hypromellose- (ARTIFICIAL TEARS) 0 2-0 2-1 % SOLN Administer 2 drops to both eyes every 4 (four) hours as needed (Dry Eye) 15 mL 0    ibuprofen (MOTRIN) 600 mg tablet Take 1 tablet by mouth every 6 (six) hours as needed for mild pain 30 tablet 0    meloxicam (MOBIC) 15 mg tablet Take 1 tablet (15 mg total) by mouth daily 30 tablet 3    fluticasone (VERAMYST) 27 5 MCG/SPRAY nasal spray 2 sprays into each nostril daily       No current facility-administered medications for this visit  Allergies: Allergies   Allergen Reactions    Penicillins Other (See Comments), Itching and Rash     Serum sickness  Pt developed "crystals", and itchy rash    Amoxicillin Rash and Syncope      Physical Exam:     /80   Pulse 71   Temp (!) 96 3 °F (35 7 °C)   Resp 16   Ht 5' 4 49" (1 638 m)   Wt 88 6 kg (195 lb 6 4 oz)   SpO2 97%   BMI 33 03 kg/m²     Physical Exam   Constitutional: She is oriented to person, place, and time  Vital signs are normal  She appears well-developed and well-nourished  HENT:   Head: Normocephalic and atraumatic  Nose: Nose normal    Mouth/Throat: Oropharynx is clear and moist    Eyes: Pupils are equal, round, and reactive to light  Neck: Normal range of motion  Neck supple  No thyromegaly present  Cardiovascular: Normal rate and regular rhythm  No murmur heard  Pulmonary/Chest: Effort normal and breath sounds normal    Abdominal: Soft  Bowel sounds are normal    Musculoskeletal: Normal range of motion  She exhibits no edema or deformity  Neurological: She is alert and oriented to person, place, and time  She has normal reflexes  Skin: Skin is warm  No rash noted  No erythema  Psychiatric: She has a normal mood and affect  Her behavior is normal      BMI Counseling: Body mass index is 33 03 kg/m²  Discussed the patient's BMI with her  The BMI is above average  BMI counseling and education was provided to the patient   Nutrition recommendations include reducing portion sizes, 3-5 servings of fruits/vegetables daily, consuming healthier snacks and moderation in carbohydrate intake  Exercise recommendations include moderate aerobic physical activity for 150 minutes/week      Enrique Mixon MD  [id number] [facility]

## 2019-08-01 LAB
CHOLEST SERPL-MCNC: 228 MG/DL
CHOLEST/HDLC SERPL: 3.2 (CALC)
HCV AB S/CO SERPL IA: 0.06
HCV AB SERPL QL IA: NORMAL
HDLC SERPL-MCNC: 71 MG/DL
LDLC SERPL CALC-MCNC: 137 MG/DL (CALC)
NONHDLC SERPL-MCNC: 157 MG/DL (CALC)
TRIGL SERPL-MCNC: 98 MG/DL

## 2019-08-12 DIAGNOSIS — F41.9 ANXIETY: ICD-10-CM

## 2019-08-12 RX ORDER — ESCITALOPRAM OXALATE 10 MG/1
TABLET ORAL
Qty: 90 TABLET | Refills: 3 | Status: SHIPPED | OUTPATIENT
Start: 2019-08-12 | End: 2020-07-29

## 2019-09-16 ENCOUNTER — OFFICE VISIT (OUTPATIENT)
Dept: FAMILY MEDICINE CLINIC | Facility: CLINIC | Age: 59
End: 2019-09-16
Payer: COMMERCIAL

## 2019-09-16 VITALS
WEIGHT: 194.2 LBS | OXYGEN SATURATION: 97 % | TEMPERATURE: 97.4 F | SYSTOLIC BLOOD PRESSURE: 128 MMHG | HEART RATE: 69 BPM | BODY MASS INDEX: 33.16 KG/M2 | HEIGHT: 64 IN | RESPIRATION RATE: 16 BRPM | DIASTOLIC BLOOD PRESSURE: 80 MMHG

## 2019-09-16 DIAGNOSIS — J30.1 NON-SEASONAL ALLERGIC RHINITIS DUE TO POLLEN: Primary | ICD-10-CM

## 2019-09-16 DIAGNOSIS — R42 VERTIGO: ICD-10-CM

## 2019-09-16 PROCEDURE — 99213 OFFICE O/P EST LOW 20 MIN: CPT | Performed by: FAMILY MEDICINE

## 2019-09-16 PROCEDURE — 3008F BODY MASS INDEX DOCD: CPT | Performed by: FAMILY MEDICINE

## 2019-09-16 RX ORDER — MECLIZINE HYDROCHLORIDE 25 MG/1
25 TABLET ORAL EVERY 8 HOURS SCHEDULED
Qty: 30 TABLET | Refills: 0 | Status: SHIPPED | OUTPATIENT
Start: 2019-09-16 | End: 2020-03-09

## 2019-09-16 NOTE — PROGRESS NOTES
Assessment/Plan:    No problem-specific Assessment & Plan notes found for this encounter  Diagnoses and all orders for this visit:    Non-seasonal allergic rhinitis due to pollen  Comments:  flonase daily     Vertigo  -     meclizine (ANTIVERT) 25 mg tablet; Take 1 tablet (25 mg total) by mouth every 8 (eight) hours          Subjective:      Patient ID: Charla Gomez is a 66 y o  female  Headache    This is a new problem  The current episode started in the past 7 days  The problem has been waxing and waning  The pain is located in the frontal region  The pain quality is not similar to prior headaches  The pain is mild  Associated symptoms include dizziness  Pertinent negatives include no abdominal pain, abnormal behavior, anorexia, back pain, blurred vision, coughing, drainage, ear pain, eye pain, eye redness, eye watering, facial sweating, fever, hearing loss, insomnia, loss of balance, muscle aches, nausea, neck pain, numbness, phonophobia, photophobia, rhinorrhea, scalp tenderness, seizures, sinus pressure, sore throat, swollen glands, tingling, tinnitus, visual change, vomiting or weakness  She has tried acetaminophen for the symptoms  The treatment provided mild relief  There is no history of cancer, cluster headaches, hypertension, migraine headaches, migraines in the family, obesity, recent head traumas, sinus disease or TMJ  The following portions of the patient's history were reviewed and updated as appropriate: allergies, current medications, past family history, past medical history, past social history, past surgical history and problem list     Review of Systems   Constitutional: Negative for fever  HENT: Negative for ear pain, hearing loss, rhinorrhea, sinus pressure, sore throat and tinnitus  Eyes: Negative for blurred vision, photophobia, pain and redness  Respiratory: Negative for cough  Gastrointestinal: Negative for abdominal pain, anorexia, nausea and vomiting  Musculoskeletal: Negative for back pain and neck pain  Neurological: Positive for dizziness and headaches  Negative for tingling, seizures, weakness, numbness and loss of balance  Psychiatric/Behavioral: Negative  The patient does not have insomnia  Objective:      /80 (BP Location: Left arm, Patient Position: Sitting, Cuff Size: Standard)   Pulse 69   Temp (!) 97 4 °F (36 3 °C) (Tympanic)   Resp 16   Ht 5' 4" (1 626 m)   Wt 88 1 kg (194 lb 3 2 oz)   SpO2 97%   BMI 33 33 kg/m²          Physical Exam   Constitutional: She is oriented to person, place, and time  She appears well-developed and well-nourished  Eyes: Pupils are equal, round, and reactive to light  EOM are normal    Neck: No tracheal deviation present  No thyromegaly present  Cardiovascular: Normal rate and regular rhythm  Exam reveals no friction rub  No murmur heard  Pulmonary/Chest: Effort normal and breath sounds normal    Neurological: She is alert and oriented to person, place, and time  No cranial nerve deficit  Coordination normal    Psychiatric: She has a normal mood and affect   Her behavior is normal

## 2020-01-13 ENCOUNTER — OFFICE VISIT (OUTPATIENT)
Dept: FAMILY MEDICINE CLINIC | Facility: CLINIC | Age: 60
End: 2020-01-13
Payer: COMMERCIAL

## 2020-01-13 VITALS
HEART RATE: 75 BPM | TEMPERATURE: 97.6 F | SYSTOLIC BLOOD PRESSURE: 128 MMHG | BODY MASS INDEX: 32.64 KG/M2 | WEIGHT: 191.2 LBS | OXYGEN SATURATION: 95 % | DIASTOLIC BLOOD PRESSURE: 82 MMHG | RESPIRATION RATE: 16 BRPM | HEIGHT: 64 IN

## 2020-01-13 DIAGNOSIS — Z12.11 COLON CANCER SCREENING: ICD-10-CM

## 2020-01-13 DIAGNOSIS — M35.9 CONNECTIVE TISSUE DISEASE (HCC): Primary | ICD-10-CM

## 2020-01-13 DIAGNOSIS — F41.0 PANIC DISORDER WITHOUT AGORAPHOBIA: ICD-10-CM

## 2020-01-13 DIAGNOSIS — F41.8 DEPRESSION WITH ANXIETY: ICD-10-CM

## 2020-01-13 DIAGNOSIS — G56.02 CARPAL TUNNEL SYNDROME OF LEFT WRIST: ICD-10-CM

## 2020-01-13 DIAGNOSIS — I10 ESSENTIAL HYPERTENSION: ICD-10-CM

## 2020-01-13 PROCEDURE — 3079F DIAST BP 80-89 MM HG: CPT | Performed by: FAMILY MEDICINE

## 2020-01-13 PROCEDURE — 3074F SYST BP LT 130 MM HG: CPT | Performed by: FAMILY MEDICINE

## 2020-01-13 PROCEDURE — 3008F BODY MASS INDEX DOCD: CPT | Performed by: FAMILY MEDICINE

## 2020-01-13 PROCEDURE — 99214 OFFICE O/P EST MOD 30 MIN: CPT | Performed by: FAMILY MEDICINE

## 2020-01-13 NOTE — PROGRESS NOTES
Assessment/Plan:    No problem-specific Assessment & Plan notes found for this encounter  Diagnoses and all orders for this visit:    Connective tissue disease ([address] )  Comments:  meloxicam PRN     Depression with anxiety  Comments:  doing well on Lexapro     Panic disorder without agoraphobia  Comments:  lexapro as directed    Essential hypertension  Comments:  stable  continue to monitor    Carpal tunnel syndrome of left wrist  -     Ambulatory referral to Orthopedic Surgery    Colon cancer screening  -     Ambulatory referral to Colorectal Surgery; Future          Subjective:      Patient ID: Fiordaliza Doyle is a 64 y o  female  On and off high blood pressure   BP at home 120-130/70-90  No chest pain /shortness of breath      Shoulder Pain    The pain is present in the right shoulder  This is a recurrent problem  The current episode started more than 1 month ago  The problem occurs intermittently  The pain is at a severity of 2/10  Associated symptoms include stiffness  Pertinent negatives include no fever, inability to bear weight, joint locking, joint swelling, numbness or tingling  The symptoms are aggravated by activity  She has tried cold and NSAIDS for the symptoms  The treatment provided mild relief  Family history does not include gout or rheumatoid arthritis  There is no history of diabetes, gout, osteoarthritis or rheumatoid arthritis  Hypertension   This is a chronic problem  The current episode started more than 1 year ago  The problem has been waxing and waning since onset  The problem is controlled  Pertinent negatives include no anxiety, blurred vision, chest pain, malaise/fatigue, neck pain, orthopnea, peripheral edema, PND or shortness of breath  Risk factors for coronary artery disease include obesity  The current treatment provides mild improvement  There are no compliance problems  There is no history of angina, CVA or PVD         The following portions of the patient's history were reviewed and updated as appropriate: allergies, current medications, past family history, past medical history, past social history, past surgical history and problem list     Review of Systems   Constitutional: Negative  Negative for fever and malaise/fatigue  HENT: Negative  Negative for congestion and dental problem  Eyes: Negative for blurred vision, discharge and itching  Respiratory: Negative for apnea, chest tightness and shortness of breath  Cardiovascular: Negative for chest pain, orthopnea, leg swelling and PND  Gastrointestinal: Negative for abdominal distention and abdominal pain  Genitourinary: Negative for menstrual problem, pelvic pain and urgency  Musculoskeletal: Positive for arthralgias and stiffness  Negative for gout and neck pain  Neurological: Negative for tingling and numbness  Objective:      /82 (BP Location: Left arm, Patient Position: Sitting, Cuff Size: Standard)   Pulse 75   Temp 97 6 °F (36 4 °C) (Tympanic)   Resp 16   Ht 5' 4" (1 626 m)   Wt 86 7 kg (191 lb 3 2 oz)   SpO2 95%   BMI 32 82 kg/m²          Physical Exam   Constitutional: She is oriented to person, place, and time  She appears well-developed and well-nourished  HENT:   Head: Normocephalic  Mouth/Throat: No oropharyngeal exudate  Cardiovascular: Normal rate and regular rhythm  Exam reveals no friction rub  No murmur heard  Pulmonary/Chest: Breath sounds normal  No respiratory distress  Musculoskeletal: She exhibits tenderness  She exhibits no edema  Right shoulder    Neurological: She is alert and oriented to person, place, and time  Skin: Skin is warm  Psychiatric: She has a normal mood and affect   Her behavior is normal

## 2020-02-12 ENCOUNTER — OFFICE VISIT (OUTPATIENT)
Dept: OBGYN CLINIC | Facility: CLINIC | Age: 60
End: 2020-02-12
Payer: COMMERCIAL

## 2020-02-12 VITALS
HEART RATE: 76 BPM | BODY MASS INDEX: 32.61 KG/M2 | WEIGHT: 191 LBS | DIASTOLIC BLOOD PRESSURE: 81 MMHG | SYSTOLIC BLOOD PRESSURE: 129 MMHG | HEIGHT: 64 IN

## 2020-02-12 DIAGNOSIS — G56.02 CARPAL TUNNEL SYNDROME OF LEFT WRIST: Primary | ICD-10-CM

## 2020-02-12 DIAGNOSIS — Z01.812 ENCOUNTER FOR PREPROCEDURAL LABORATORY EXAMINATION: ICD-10-CM

## 2020-02-12 PROCEDURE — 99243 OFF/OP CNSLTJ NEW/EST LOW 30: CPT | Performed by: SURGERY

## 2020-02-12 NOTE — PROGRESS NOTES
Charley POTTER  Attending, Orthopaedic Surgery  Hand, Wrist, and Elbow Surgery  [facility]      ORTHOPAEDIC HAND, WRIST, AND ELBOW OFFICE  VISIT       ASSESSMENT/PLAN:      64 y o  female with left carpal tunnel syndrome  Left open carpal tunnel release was discussed at length today  Left open carpal tunnel release surgical consent was signed in the office today  She was advised to continue nighttime bracing  Surgery will be performed under "twilight sedation"  Pillar pain was discussed as a possibility following surgery, which will improve overtime  I will see her back in the office 10-14 days following surgery for suture removal  A EKG and BMP will be obtained prior to surgical intervention  The patient verbalized understanding of exam findings and treatment plan  We engaged in the shared decision-making process and treatment options were discussed at length with the patient  Surgical and conservative management discussed today along with risks and benefits  Diagnoses and all orders for this visit:    Carpal tunnel syndrome of left wrist      Follow Up:  Return 10-14 days after sx   To Do Next Visit:  Re-evaluation of current issue and Sutures out      General Discussions:  Carpal Tunnel Syndrome: The anatomy and physiology of carpal tunnel syndrome was discussed with the patient today  Increase pressure localized under the transverse carpal ligament can cause pain, numbness, tingling, or dysesthesias within the median nerve distribution as well as feelings of fatigue, clumsiness, or awkwardness  These symptoms typically occur at night and worse in the morning upon waking  Eventually, untreated carpal tunnel syndrome can result in weakness and permanent loss of muscle within the thenar compartment of the hand  Treatment options were discussed with the patient    Conservative treatment includes nocturnal resting splints to keep the nerve in a neutral position, ergonomic changes within the work or home environment, activity modification, and tendon gliding exercises  Vitamin B6 one tablet daily over the counter may helpful to reduce symptoms  Steroid injections within the carpal canal can help a majority of patients, however this is often self-limited in a majority of patients  Surgical intervention to divide the transverse carpal ligament typically results in a long-lasting relief of the patient's complaints, with the recurrence rate of less than 1%  Operative Discussions:  Open Carpal Tunnel Release: The anatomy and physiology of carpal tunnel syndrome was discussed with the patient today  Increase pressure localized under the transverse carpal ligament can cause pain, numbness, tingling, or dysesthesias within the median nerve distribution as well as feelings of fatigue, clumsiness, or awkwardness  These symptoms typically occur at night and worse in the morning upon waking  Eventually, untreated carpal tunnel syndrome can result in weakness and permanent loss of muscle within the thenar compartment of the hand  Treatment options were discussed with the patient  Conservative treatment includes nocturnal resting splints to keep the nerve in a neutral position, ergonomic changes within the work or home environment, activity modification, and tendon gliding exercises  Vitamin B6 one tablet daily over the counter may helpful to reduce symptoms  Steroid injections within the carpal canal can help a majority of patients, however this is often self-limited in a majority of patients  Surgical intervention to divide the transverse carpal ligament typically results in a long-lasting relief of the patient's complaints, with the recurrence rate of less than 1%   The patient has elected to undergo an open carpal tunnel release  The palmar incision technique was discussed in the office with the patient today  In the postoperative period, light activities are allowed immediately, driving is allowed when narcotic medication has stopped, and the bandages may be removed and incision may get wet after 2 days  Heavy activities (lifting more than approximately 10 pounds) will be allowed after follow up appointment in 1-2 weeks  While night symptoms (waking from sleep, pain, and discomfort in the hands) generally improves rapidly, the numbness and tingling as well as the strength will slowly improve over weeks to months depending on the chronicity and severity of the carpal tunnel syndrome  Pillar pain and scar discomfort were discussed with the patient which are self-limiting conditions  The risks of bleeding and infection from the surgery are less than 1%  Risk of recurrence is approximately 0 5%  The risks of nerve injury or nerve damage or damage to the blood vessels is approximately 1 in 1200  The patient has an understanding of the above mentioned discussion  The risks and benefits of the procedure were explained to the patient, which include, but are not limited to: Bleeding, infection, recurrence, pain, scar, damage to tendons, damage to nerves, and damage to blood vessels, failure to give desired results and complications related to anesthesia  These risks, along with alternative conservative treatment options, and postoperative protocols were voiced back and understood by the patient  All questions were answered to the patient's satisfaction  The patient agrees to comply with a standard postoperative protocol, and is willing to proceed  Education was provided via written and auditory forms  There were no barriers to learning  Written handouts regarding wound care, incision and scar care, and general preoperative information was provided to the patient    Prior to surgery, the patient may be requested to stop all anti-inflammatory medications  Prophylactic aspirin, Plavix, and Coumadin may be allowed to be continued  Medications including vitamin E , ginkgo, and fish oil are requested to be stopped approximately one week prior to surgery  Hypertensive medications and beta blockers, if taken, s      ____________________________________________________________________________________________________________________________________________      CHIEF COMPLAINT:  Chief Complaint   Patient presents with    Left Wrist - Pain       SUBJECTIVE:  Dillon Perez is a 64y o  year old RHD female who presents to the office today for left hand numbness and tingling  Kiesha was referred to the office by her PCP, Dr Federico Thomas  The patient states that she has been experiencing numbness and tingling to all of the digits on her left hand for multiple years  She states that this is intermittent in nature and is worse at night  She is wearing a left cock up wrist brace at night, with improvement  She states that if she does not wear the brace at night she will wake up from sleep at night  The patient had an endoscopic right carpal tunnel release performed by Dr Emi Arguello a few years ago, with complete relief of her right sided numbness and tingling        Pain/symptom timing:  Worse during the day when active  Pain/symptom context:  Worse with activites and work  Pain/symptom modifying factors:  Rest makes better, activities make worse  Pain/symptom associated signs/symptoms: none    Prior treatment   · NSAIDsNo   · Injections No   · Bracing/Orthotics Yes    Physical Therapy No     I have personally reviewed all the relevant PMH, PSH, SH, FH, Medications and allergies      PAST MEDICAL HISTORY:  Past Medical History:   Diagnosis Date    Known health problems: none     Neoplasm of ovary     Bilateral       PAST SURGICAL HISTORY:  Past Surgical History:   Procedure Laterality Date    CHOLECYSTECTOMY      IN WRIST Gloris Eisenmenger LIG Right 6/2/2017    Procedure: ENDOSCOPIC CARPAL TUNNEL RELEASE ;  Surgeon: Yesy Self MD;  Location: [facility];  Service: Orthopedics       FAMILY HISTORY:  Family History   Problem Relation Age of Onset    Hypothyroidism Mother     Dementia Mother     Aortic aneurysm Mother         Abdominal    Arthritis Mother     Diverticulitis Mother     Pancreatitis Mother     Other Mother         Tachycardia    No Known Problems Father     Breast cancer Sister 64    Other Sister         Neoplasm of the brain, Nontropical (celiac) sprue    Diabetes Paternal Grandmother        SOCIAL HISTORY:  Social History     Tobacco Use    Smoking status: Never Smoker    Smokeless tobacco: Never Used   Substance Use Topics    Alcohol use: No     Comment: Per [employer]:  Alcohol use (1/mo)    Drug use: No       MEDICATIONS:    Current Outpatient Medications:     Calcium Carb-Cholecalciferol (CALTRATE 600+D) 600-800 MG-UNIT TABS, Take by mouth, Disp: , Rfl:     cetirizine (ZYRTEC ALLERGY) 10 mg tablet, Take 1 tablet by mouth daily, Disp: , Rfl:     cholecalciferol (VITAMIN D3) 1,000 units tablet, Take 1 tablet by mouth daily, Disp: , Rfl:     escitalopram (LEXAPRO) 10 mg tablet, TAKE 1 TABLET BY MOUTH EVERY DAY, Disp: 90 tablet, Rfl: 3    fluticasone (VERAMYST) 27 5 MCG/SPRAY nasal spray, 2 sprays into each nostril daily, Disp: , Rfl:     glycerin-hypromellose- (ARTIFICIAL TEARS) 0 2-0 2-1 % SOLN, Administer 2 drops to both eyes every 4 (four) hours as needed (Dry Eye), Disp: 15 mL, Rfl: 0    ibuprofen (MOTRIN) 600 mg tablet, Take 1 tablet by mouth every 6 (six) hours as needed for mild pain, Disp: 30 tablet, Rfl: 0    meloxicam (MOBIC) 15 mg tablet, Take 1 tablet (15 mg total) by mouth daily, Disp: 30 tablet, Rfl: 3    meclizine (ANTIVERT) 25 mg tablet, Take 1 tablet (25 mg total) by mouth every 8 (eight) hours (Patient not taking: Reported on 2/12/2020), Disp: 30 tablet, Rfl: 0    ALLERGIES:  Allergies   Allergen Reactions    Penicillins Other (See Comments), Itching and Rash     Serum sickness  Pt developed "crystals", and itchy rash    Amoxicillin Rash and Syncope           REVIEW OF SYSTEMS:  Review of Systems   Constitutional: Negative for chills, fever and unexpected weight change  HENT: Negative for hearing loss, nosebleeds and sore throat  Eyes: Negative for pain, redness and visual disturbance  Respiratory: Negative for cough, shortness of breath and wheezing  Cardiovascular: Negative for chest pain, palpitations and leg swelling  Gastrointestinal: Negative for abdominal pain, nausea and vomiting  Endocrine: Negative for polydipsia and polyuria  Genitourinary: Negative for difficulty urinating and hematuria  Musculoskeletal: Negative for arthralgias, joint swelling and myalgias  Skin: Negative for rash and wound  Neurological: Positive for numbness  Negative for dizziness and headaches  Psychiatric/Behavioral: Negative for decreased concentration, dysphoric mood and suicidal ideas  The patient is not nervous/anxious          VITALS:  Vitals:    02/12/20 1509   BP: 129/81   Pulse: 76       LABS:  HgA1c:   Lab Results   Component Value Date    HGBA1C 5 6 03/11/2016     BMP:   Lab Results   Component Value Date    GLUCOSE 87 04/21/2015    CALCIUM 9 5 06/23/2018     05/13/2017    K 4 3 06/23/2018    CO2 28 06/23/2018     06/23/2018    BUN 14 06/23/2018    CREATININE 0 81 06/23/2018       _____________________________________________________  PHYSICAL EXAMINATION:  General: well developed and well nourished, alert, oriented times 3 and appears comfortable  Psychiatric: Normal  HEENT: Normocephalic, Atraumatic Trachea Midline, No torticollis  Pulmonary: No audible wheezing or respiratory distress   Cardiovascular: No pitting edema, 2+ radial pulse   Skin: No masses, erythema, lacerations, fluctation, ulcerations  Neurovascular: Sensation Intact to the Median, Ulnar, Radial Nerve, Motor Intact to the Median, Ulnar, Radial Nerve and Pulses Intact  Musculoskeletal: Normal, except as noted in detailed exam and in HPI  MUSCULOSKELETAL EXAMINATION:    Left Carpal Tunnel Exam:    Negative thenar atrophy  Negative phalen's test  Negative carpal tunnel compression  Negative tinels over median nerve at the wrist  APB and 1st dorsal interosseous strength 5/5      ___________________________________________________  STUDIES REVIEWED:  I have personally reviewed EMG from 2016 which demonstrates moderate left carpal tunnel syndrome, without motor involvement             PROCEDURES PERFORMED:  Procedures  No Procedures performed today    _____________________________________________________      [facility]    I,:   Serina Ritter am acting as a scribe while in the presence of the attending physician :        I,:   Dorothy Hinojosa MD personally performed the services described in this documentation    as scribed in my presence :

## 2020-02-24 LAB
BUN SERPL-MCNC: 15 MG/DL (ref 7–25)
BUN/CREAT SERPL: NORMAL (CALC) (ref 6–22)
CALCIUM SERPL-MCNC: 9.8 MG/DL (ref 8.6–10.4)
CHLORIDE SERPL-SCNC: 104 MMOL/L (ref 98–110)
CO2 SERPL-SCNC: 29 MMOL/L (ref 20–32)
CREAT SERPL-MCNC: 0.79 MG/DL (ref 0.5–1.05)
GLUCOSE SERPL-MCNC: 67 MG/DL (ref 65–99)
POTASSIUM SERPL-SCNC: 3.9 MMOL/L (ref 3.5–5.3)
SL AMB EGFR AFRICAN AMERICAN: 96 ML/MIN/1.73M2
SL AMB EGFR NON AFRICAN AMERICAN: 83 ML/MIN/1.73M2
SODIUM SERPL-SCNC: 140 MMOL/L (ref 135–146)

## 2020-02-28 DIAGNOSIS — M25.50 ARTHRALGIA OF MULTIPLE JOINTS: ICD-10-CM

## 2020-02-28 RX ORDER — MELOXICAM 15 MG/1
TABLET ORAL
Qty: 30 TABLET | Refills: 3 | Status: SHIPPED | OUTPATIENT
Start: 2020-02-28 | End: 2020-09-24 | Stop reason: SDUPTHER

## 2020-02-28 NOTE — TELEPHONE ENCOUNTER
Requested medication(s) are due for refill today: Yes  Patient has already received a courtesy refill: No  Other reason request has been forwarded to provider: last filled 1 year ago

## 2020-03-04 ENCOUNTER — TRANSCRIBE ORDERS (OUTPATIENT)
Dept: LAB | Facility: CLINIC | Age: 60
End: 2020-03-04

## 2020-03-04 DIAGNOSIS — G56.02 CARPAL TUNNEL SYNDROME OF LEFT WRIST: ICD-10-CM

## 2020-03-04 DIAGNOSIS — Z01.812 ENCOUNTER FOR PREPROCEDURAL LABORATORY EXAMINATION: ICD-10-CM

## 2020-03-04 LAB
ATRIAL RATE: 65 BPM
P AXIS: 28 DEGREES
PR INTERVAL: 154 MS
QRS AXIS: -26 DEGREES
QRSD INTERVAL: 98 MS
QT INTERVAL: 378 MS
QTC INTERVAL: 393 MS
T WAVE AXIS: 22 DEGREES
VENTRICULAR RATE: 65 BPM

## 2020-03-04 PROCEDURE — 93010 ELECTROCARDIOGRAM REPORT: CPT | Performed by: INTERNAL MEDICINE

## 2020-03-04 PROCEDURE — 93005 ELECTROCARDIOGRAM TRACING: CPT

## 2020-05-18 ENCOUNTER — ANESTHESIA EVENT (OUTPATIENT)
Dept: GASTROENTEROLOGY | Facility: AMBULARY SURGERY CENTER | Age: 60
End: 2020-05-18

## 2020-05-29 ENCOUNTER — ANESTHESIA (OUTPATIENT)
Dept: GASTROENTEROLOGY | Facility: AMBULARY SURGERY CENTER | Age: 60
End: 2020-05-29

## 2020-07-29 DIAGNOSIS — F41.9 ANXIETY: ICD-10-CM

## 2020-07-29 RX ORDER — ESCITALOPRAM OXALATE 10 MG/1
TABLET ORAL
Qty: 30 TABLET | Refills: 0 | Status: SHIPPED | OUTPATIENT
Start: 2020-07-29 | End: 2020-08-20

## 2020-08-20 DIAGNOSIS — F41.9 ANXIETY: ICD-10-CM

## 2020-08-20 PROBLEM — R11.2 PONV (POSTOPERATIVE NAUSEA AND VOMITING): Status: ACTIVE | Noted: 2020-08-20

## 2020-08-20 PROBLEM — Z98.890 PONV (POSTOPERATIVE NAUSEA AND VOMITING): Status: ACTIVE | Noted: 2020-08-20

## 2020-08-20 RX ORDER — ESCITALOPRAM OXALATE 10 MG/1
TABLET ORAL
Qty: 30 TABLET | Refills: 0 | Status: SHIPPED | OUTPATIENT
Start: 2020-08-20 | End: 2020-12-02 | Stop reason: SDUPTHER

## 2020-08-20 NOTE — TELEPHONE ENCOUNTER
Requested medication(s) are due for refill today: Yes  Patient has already received a courtesy refill: No  Other reason request has been forwarded to provider:      PT HAS OV SCHEDULED FOR 8/31

## 2020-08-20 NOTE — ANESTHESIA PREPROCEDURE EVALUATION
Procedure:  COLONOSCOPY    Relevant Problems   ANESTHESIA   (+) PONV (postoperative nausea and vomiting)      NEURO/PSYCH   (+) Depression with anxiety   (+) Panic disorder without agoraphobia        Physical Exam    Airway    Mallampati score: II  TM Distance: >3 FB  Neck ROM: full     Dental   No notable dental hx     Cardiovascular  Rhythm: regular, Rate: normal, Cardiovascular exam normal    Pulmonary  Pulmonary exam normal Breath sounds clear to auscultation,     Other Findings        Anesthesia Plan  ASA Score- 2     Anesthesia Type- IV sedation with anesthesia with ASA Monitors  Additional Monitors:   Airway Plan:     Comment: Discussed risks/benefits, including medication reactions, awareness, aspiration, and serious/life threatening complications  Plan to maintain native airway with IVGA, monitored with EtCO2  Plan Factors-Exercise tolerance (METS): >4 METS  Patient summary reviewed  Patient instructed to abstain from smoking on day of procedure  Patient did not smoke on day of surgery  Induction- intravenous  Postoperative Plan- Plan for postoperative opioid use  Planned trial extubation    Informed Consent- Anesthetic plan and risks discussed with patient  I personally reviewed this patient with the CRNA  Discussed and agreed on the Anesthesia Plan with the CRNA  Tabby Orellana

## 2020-08-21 ENCOUNTER — HOSPITAL ENCOUNTER (OUTPATIENT)
Dept: GASTROENTEROLOGY | Facility: AMBULARY SURGERY CENTER | Age: 60
Setting detail: OUTPATIENT SURGERY
Discharge: HOME | End: 2020-08-21
Attending: COLON & RECTAL SURGERY | Admitting: COLON & RECTAL SURGERY
Payer: COMMERCIAL

## 2020-08-21 VITALS
OXYGEN SATURATION: 98 % | RESPIRATION RATE: 16 BRPM | HEART RATE: 68 BPM | HEIGHT: 65 IN | DIASTOLIC BLOOD PRESSURE: 75 MMHG | TEMPERATURE: 98 F | BODY MASS INDEX: 32.76 KG/M2 | WEIGHT: 196.6 LBS | SYSTOLIC BLOOD PRESSURE: 132 MMHG

## 2020-08-21 DIAGNOSIS — Z12.11 COLON CANCER SCREENING: ICD-10-CM

## 2020-08-21 PROCEDURE — 99213 OFFICE O/P EST LOW 20 MIN: CPT | Performed by: COLON & RECTAL SURGERY

## 2020-08-21 PROCEDURE — G0121 COLON CA SCRN NOT HI RSK IND: HCPCS | Performed by: COLON & RECTAL SURGERY

## 2020-08-21 RX ORDER — LIDOCAINE HYDROCHLORIDE 10 MG/ML
0.5 INJECTION, SOLUTION EPIDURAL; INFILTRATION; INTRACAUDAL; PERINEURAL ONCE AS NEEDED
Status: DISCONTINUED | OUTPATIENT
Start: 2020-08-21 | End: 2020-08-25 | Stop reason: HOSPADM

## 2020-08-21 RX ORDER — PROPOFOL 10 MG/ML
INJECTION, EMULSION INTRAVENOUS CONTINUOUS PRN
Status: DISCONTINUED | OUTPATIENT
Start: 2020-08-21 | End: 2020-08-21

## 2020-08-21 RX ORDER — SODIUM CHLORIDE, SODIUM LACTATE, POTASSIUM CHLORIDE, CALCIUM CHLORIDE 600; 310; 30; 20 MG/100ML; MG/100ML; MG/100ML; MG/100ML
125 INJECTION, SOLUTION INTRAVENOUS CONTINUOUS
Status: DISCONTINUED | OUTPATIENT
Start: 2020-08-21 | End: 2020-08-25 | Stop reason: HOSPADM

## 2020-08-21 RX ORDER — LIDOCAINE HYDROCHLORIDE 10 MG/ML
INJECTION, SOLUTION EPIDURAL; INFILTRATION; INTRACAUDAL; PERINEURAL AS NEEDED
Status: DISCONTINUED | OUTPATIENT
Start: 2020-08-21 | End: 2020-08-21

## 2020-08-21 RX ORDER — PROPOFOL 10 MG/ML
INJECTION, EMULSION INTRAVENOUS AS NEEDED
Status: DISCONTINUED | OUTPATIENT
Start: 2020-08-21 | End: 2020-08-21

## 2020-08-21 RX ADMIN — PROPOFOL 100 MCG/KG/MIN: 10 INJECTION, EMULSION INTRAVENOUS at 08:34

## 2020-08-21 RX ADMIN — LIDOCAINE HYDROCHLORIDE 50 MG: 10 INJECTION, SOLUTION EPIDURAL; INFILTRATION; INTRACAUDAL; PERINEURAL at 08:34

## 2020-08-21 RX ADMIN — PROPOFOL 80 MG: 10 INJECTION, EMULSION INTRAVENOUS at 08:34

## 2020-08-21 RX ADMIN — SODIUM CHLORIDE, SODIUM LACTATE, POTASSIUM CHLORIDE, AND CALCIUM CHLORIDE: .6; .31; .03; .02 INJECTION, SOLUTION INTRAVENOUS at 08:14

## 2020-08-21 RX ADMIN — PROPOFOL 50 MG: 10 INJECTION, EMULSION INTRAVENOUS at 08:45

## 2020-08-21 NOTE — H&P
History and Physical   Colon and Rectal Surgery   Skylar Leon 64 y o  female MRN: [id number]  Unit/Bed#:  Encounter: [id number]  08/21/20   8:26 AM      CC:  History of polyps  History of Present Illness   HPI:  Skylar Leon is a 64 y o  female with no GI symptoms      Historical Information   Past Medical History:   Diagnosis Date    Anxiety     Arthritis     Colon polyp     Neoplasm of ovary     Bilateral    PONV (postoperative nausea and vomiting)      Past Surgical History:   Procedure Laterality Date    CHOLECYSTECTOMY      COLONOSCOPY      MD WRIST Janelle Crandall LIG Right 6/2/2017    Procedure: ENDOSCOPIC CARPAL TUNNEL RELEASE ;  Surgeon: Bart Rivera MD;  [facility];  Service: Orthopedics    UTERINE FIBROID SURGERY         Meds/Allergies     (Not in a hospital admission)        Current Outpatient Medications:     Calcium Carb-Cholecalciferol (CALTRATE 600+D) 600-800 MG-UNIT TABS, Take by mouth, Disp: , Rfl:     cetirizine (ZYRTEC ALLERGY) 10 mg tablet, Take 1 tablet by mouth daily, Disp: , Rfl:     cholecalciferol (VITAMIN D3) 1,000 units tablet, Take 1 tablet by mouth daily, Disp: , Rfl:     escitalopram (LEXAPRO) 10 mg tablet, TAKE 1 TABLET BY MOUTH EVERY DAY, Disp: 30 tablet, Rfl: 0    glycerin-hypromellose- (ARTIFICIAL TEARS) 0 2-0 2-1 % SOLN, Administer 2 drops to both eyes every 4 (four) hours as needed (Dry Eye), Disp: 15 mL, Rfl: 0    meloxicam (MOBIC) 15 mg tablet, TAKE 1 TABLET BY MOUTH EVERY DAY (Patient taking differently: Take 15 mg by mouth as needed Take with food or milk ), Disp: 30 tablet, Rfl: 3    ibuprofen (MOTRIN) 600 mg tablet, Take 1 tablet by mouth every 6 (six) hours as needed for mild pain, Disp: 30 tablet, Rfl: 0    Current Facility-Administered Medications:     lactated ringers infusion, 125 mL/hr, Intravenous, Continuous, Hina Whitehead MD    lidocaine (PF) (XYLOCAINE-MPF) 1 % injection 0 5 mL, 0 5 mL, Infiltration, Once PRN, Merlin Andrea, MD    Allergies   Allergen Reactions    Penicillins Other (See Comments), Itching and Rash     Serum sickness  Pt developed "crystals", and itchy rash    Amoxicillin Rash and Syncope         Social History   Social History     Substance and Sexual Activity   Alcohol Use Yes    Frequency: Monthly or less    Drinks per session: 1 or 2    Comment:   Alcohol use (1/mo)     Social History     Substance and Sexual Activity   Drug Use No     Social History     Tobacco Use   Smoking Status Never Smoker   Smokeless Tobacco Never Used         Family History:   Family History   Problem Relation Age of Onset    Hypothyroidism Mother     Dementia Mother     Aortic aneurysm Mother         Abdominal    Arthritis Mother     Diverticulitis Mother     Pancreatitis Mother     Other Mother         Tachycardia    No Known Problems Father     Breast cancer Sister 64    Other Sister         Neoplasm of the brain, Nontropical (celiac) sprue    Diabetes Paternal Grandmother          Objective     Current Vitals:   Blood Pressure: 125/85 (08/21/20 0755)  Pulse: 75 (08/21/20 0755)  Temperature: (!) 96 5 °F (35 8 °C) (08/21/20 0755)  Temp Source: Temporal (08/21/20 0755)  Respirations: 18 (08/21/20 0755)  Height: 5' 5" (165 1 cm) (08/21/20 0755)  Weight - Scale: 89 2 kg (196 lb 9 6 oz) (08/21/20 0755)  SpO2: 94 % (08/21/20 0755)  No intake or output data in the 24 hours ending 08/21/20 0826    Physical Exam:  General:  Well nourished, no distress  Neuro: Alert and oriented  Eyes:Sclera anicteric, conjunctiva pink  Pulm: Clear to auscultation bilaterally  No respiratory Distress  CV:  Regular rate and rhythm  No murmurs  Abdomen:  Soft, flat, non-tender, without masses or hepatosplenomegaly  Lab Results:       ASSESSMENT:  Liane Nolan is a 64 y o  female for surveillance  PLAN:  Colonoscopy    Risks , including, but not limited to, bleeding, perforation, missed lesions, and potential need for surgery, were reviewed  Alternatives to colonoscopy were discussed    Noemy Hernandez MD

## 2020-08-21 NOTE — ANESTHESIA POSTPROCEDURE EVALUATION
Post-Op Assessment Note    CV Status:  Stable  Pain Score: 0    Pain management: adequate     Mental Status:  Awake   Hydration Status:  Euvolemic   PONV Controlled:  Controlled   Airway Patency:  Patent      Post Op Vitals Reviewed: Yes      Staff: CRNA         No complications documented      BP      Temp     Pulse     Resp      SpO2

## 2020-08-31 ENCOUNTER — OFFICE VISIT (OUTPATIENT)
Dept: FAMILY MEDICINE CLINIC | Facility: CLINIC | Age: 60
End: 2020-08-31
Payer: COMMERCIAL

## 2020-08-31 VITALS
DIASTOLIC BLOOD PRESSURE: 80 MMHG | OXYGEN SATURATION: 96 % | HEART RATE: 69 BPM | SYSTOLIC BLOOD PRESSURE: 120 MMHG | WEIGHT: 199.8 LBS | TEMPERATURE: 97.5 F | RESPIRATION RATE: 16 BRPM | BODY MASS INDEX: 33.29 KG/M2 | HEIGHT: 65 IN

## 2020-08-31 DIAGNOSIS — Z23 FLU VACCINE NEED: ICD-10-CM

## 2020-08-31 DIAGNOSIS — I10 ESSENTIAL HYPERTENSION: ICD-10-CM

## 2020-08-31 DIAGNOSIS — Z00.00 ANNUAL PHYSICAL EXAM: Primary | ICD-10-CM

## 2020-08-31 PROCEDURE — 99396 PREV VISIT EST AGE 40-64: CPT | Performed by: FAMILY MEDICINE

## 2020-08-31 PROCEDURE — 3008F BODY MASS INDEX DOCD: CPT | Performed by: SURGERY

## 2020-08-31 PROCEDURE — 90682 RIV4 VACC RECOMBINANT DNA IM: CPT

## 2020-08-31 PROCEDURE — 90471 IMMUNIZATION ADMIN: CPT

## 2020-08-31 NOTE — PROGRESS NOTES
[address] [facility] [address]    NAME: Anthony Pittman  AGE: 64 y o  SEX: female  : 1960     DATE: 2020     Assessment and Plan:     Problem List Items Addressed This Visit     None      Visit Diagnoses     Annual physical exam    -  Primary    Relevant Orders    Comprehensive metabolic panel    Lipid Panel with Direct LDL reflex    CBC and differential    Essential hypertension        stable   continue to monitor     Flu vaccine need        Relevant Orders    influenza vaccine, quadrivalent, recombinant, PF, 0 5 mL, for patients 18 yr+ (FLUBLOK)          Immunizations and preventive care screenings were discussed with patient today  Appropriate education was printed on patient's after visit summary  Counseling:  Alcohol/drug use: discussed moderation in alcohol intake, the recommendations for healthy alcohol use, and avoidance of illicit drug use  Dental Health: discussed importance of regular tooth brushing, flossing, and dental visits  Injury prevention: discussed safety/seat belts, safety helmets, smoke detectors, carbon dioxide detectors, and smoking near bedding or upholstery  Sexual health: discussed sexually transmitted diseases, partner selection, use of condoms, avoidance of unintended pregnancy, and contraceptive alternatives  · Exercise: the importance of regular exercise/physical activity was discussed  Recommend exercise 3-5 times per week for at least 30 minutes  BMI Counseling: Body mass index is 32 93 kg/m²  The BMI is above normal  Nutrition recommendations include decreasing portion sizes and encouraging healthy choices of fruits and vegetables  Exercise recommendations include moderate physical activity 150 minutes/week  No pharmacotherapy was ordered  No follow-ups on file       Chief Complaint:     Chief Complaint   Patient presents with    Annual Exam      History of Present Illness:     Adult Annual Physical   Patient here for a comprehensive physical exam  The patient reports no problems  Diet and Physical Activity  · Diet/Nutrition: well balanced diet and consuming 3-5 servings of fruits/vegetables daily  · Exercise: moderate cardiovascular exercise  Depression Screening  PHQ-9 Depression Screening    PHQ-9:    Frequency of the following problems over the past two weeks:            General Health  · Sleep: sleeps well and gets 7-8 hours of sleep on average  · Hearing: normal - bilateral   · Vision: goes for regular eye exams, most recent eye exam >1 year ago and wears glasses  · Dental: regular dental visits, no dental visits for >1 year and brushes teeth twice daily  /GYN Health  · Patient is: postmenopausal  · Last menstrual period: 3-4 years ago   · Contraceptive method: none  Review of Systems:     Review of Systems   Constitutional: Negative  HENT: Negative  Eyes: Negative  Respiratory: Negative  Cardiovascular: Negative  Gastrointestinal: Negative  Endocrine: Negative  Genitourinary: Negative  Musculoskeletal: Negative  Skin: Negative  Allergic/Immunologic: Negative  Neurological: Negative  Hematological: Negative  Psychiatric/Behavioral: Negative         Past Medical History:     Past Medical History:   Diagnosis Date    Anxiety     Arthritis     Colon polyp     Neoplasm of ovary     Bilateral    PONV (postoperative nausea and vomiting)       Past Surgical History:     Past Surgical History:   Procedure Laterality Date    CHOLECYSTECTOMY      COLONOSCOPY      MI WRIST Jaren Johnston LIG Right 6/2/2017    Procedure: ENDOSCOPIC CARPAL TUNNEL RELEASE ;  Surgeon: Jesica Duran MD;  [facility];  Service: Orthopedics    UTERINE FIBROID SURGERY        Social History:        Social History     Socioeconomic History    Marital status: /Civil Union     Spouse name: Not on file    Number of children: Not on file    Years of education: Not on file    Highest education level: Not on file   Occupational History    Occupation: [employer]  Financial resource strain: Not on file    Food insecurity     Worry: Not on file     Inability: Not on file   [employer] needs     Medical: Not on file     Non-medical: Not on file   Tobacco Use    Smoking status: Never Smoker    Smokeless tobacco: Never Used   Substance and Sexual Activity    Alcohol use: Yes     Frequency: Monthly or less     Drinks per session: 1 or 2     Comment:   Alcohol use (1/mo)    Drug use: No    Sexual activity: Never   Lifestyle    Physical activity     Days per week: Not on file     Minutes per session: Not on file    Stress: Not on file   Relationships    Social connections     Talks on phone: Not on file     Gets together: Not on file     Attends Gnosticist service: Not on file     Active member of club or organization: Not on file     Attends meetings of clubs or organizations: Not on file     Relationship status: Not on file    Intimate partner violence     Fear of current or ex partner: Not on file     Emotionally abused: Not on file     Physically abused: Not on file     Forced sexual activity: Not on file   Other Topics Concern    Not on file   Social History Narrative    Exercise habits:  1x week for 45 min    Living situations:   With , son, daughter, dog      Family History:     Family History   Problem Relation Age of Onset    Hypothyroidism Mother     Dementia Mother     Aortic aneurysm Mother         Abdominal    Arthritis Mother     Diverticulitis Mother     Pancreatitis Mother     Other Mother         Tachycardia    No Known Problems Father     Breast cancer Sister 64    Other Sister         Neoplasm of the brain, Nontropical (celiac) sprue    Diabetes Paternal Grandmother       Current Medications:     Current Outpatient Medications   Medication Sig Dispense Refill    Calcium Carb-Cholecalciferol (CALTRATE 600+D) 600-800 MG-UNIT TABS Take by mouth      cetirizine (ZYRTEC ALLERGY) 10 mg tablet Take 1 tablet by mouth daily      cholecalciferol (VITAMIN D3) 1,000 units tablet Take 1 tablet by mouth daily      escitalopram (LEXAPRO) 10 mg tablet TAKE 1 TABLET BY MOUTH EVERY DAY 30 tablet 0    meloxicam (MOBIC) 15 mg tablet TAKE 1 TABLET BY MOUTH EVERY DAY (Patient taking differently: Take 15 mg by mouth as needed Take with food or milk ) 30 tablet 3     No current facility-administered medications for this visit  Allergies: Allergies   Allergen Reactions    Penicillins Other (See Comments), Itching and Rash     Serum sickness  Pt developed "crystals", and itchy rash    Amoxicillin Rash and Syncope      Physical Exam:     /80   Pulse 69   Temp 97 5 °F (36 4 °C) (Tympanic)   Resp 16   Ht 5' 5 32" (1 659 m)   Wt 90 6 kg (199 lb 12 8 oz)   SpO2 96%   BMI 32 93 kg/m²     Physical Exam  Constitutional:       Appearance: She is well-developed  HENT:      Head: Normocephalic and atraumatic  Right Ear: Tympanic membrane normal       Left Ear: Tympanic membrane normal       Nose: Nose normal    Eyes:      Pupils: Pupils are equal, round, and reactive to light  Neck:      Musculoskeletal: Normal range of motion and neck supple  Cardiovascular:      Rate and Rhythm: Normal rate and regular rhythm  Pulmonary:      Effort: Pulmonary effort is normal  No respiratory distress  Breath sounds: Normal breath sounds  No wheezing  Abdominal:      General: Bowel sounds are normal       Palpations: Abdomen is soft  Musculoskeletal: Normal range of motion  General: No swelling or deformity  Right lower leg: No edema  Skin:     General: Skin is warm  Neurological:      Mental Status: She is alert and oriented to person, place, and time     Psychiatric:         Behavior: Behavior normal           Riddhi Martinez MD  [address] [facility]

## 2020-09-10 ENCOUNTER — TRANSCRIBE ORDERS (OUTPATIENT)
Dept: ADMINISTRATIVE | Age: 60
End: 2020-09-10

## 2020-09-10 ENCOUNTER — HOSPITAL ENCOUNTER (OUTPATIENT)
Dept: RADIOLOGY | Age: 60
Discharge: HOME | End: 2020-09-10
Payer: COMMERCIAL

## 2020-09-10 VITALS — BODY MASS INDEX: 32.32 KG/M2 | HEIGHT: 65 IN | WEIGHT: 194 LBS

## 2020-09-10 DIAGNOSIS — Z12.39 ENCOUNTER FOR SCREENING FOR MALIGNANT NEOPLASM OF BREAST: Primary | ICD-10-CM

## 2020-09-10 DIAGNOSIS — Z12.31 ENCOUNTER FOR SCREENING MAMMOGRAM FOR MALIGNANT NEOPLASM OF BREAST: ICD-10-CM

## 2020-09-10 PROCEDURE — 77063 BREAST TOMOSYNTHESIS BI: CPT

## 2020-09-10 PROCEDURE — 77067 SCR MAMMO BI INCL CAD: CPT

## 2020-09-11 LAB
ALBUMIN SERPL-MCNC: 4.2 G/DL (ref 3.6–5.1)
ALBUMIN/GLOB SERPL: 1.4 (CALC) (ref 1–2.5)
ALP SERPL-CCNC: 62 U/L (ref 37–153)
ALT SERPL-CCNC: 17 U/L (ref 6–29)
AST SERPL-CCNC: 17 U/L (ref 10–35)
BASOPHILS # BLD AUTO: 32 CELLS/UL (ref 0–200)
BASOPHILS NFR BLD AUTO: 1.1 %
BILIRUB SERPL-MCNC: 0.5 MG/DL (ref 0.2–1.2)
BUN SERPL-MCNC: 16 MG/DL (ref 7–25)
BUN/CREAT SERPL: NORMAL (CALC) (ref 6–22)
CALCIUM SERPL-MCNC: 9.9 MG/DL (ref 8.6–10.4)
CHLORIDE SERPL-SCNC: 105 MMOL/L (ref 98–110)
CHOLEST SERPL-MCNC: 222 MG/DL
CHOLEST/HDLC SERPL: 3.3 (CALC)
CO2 SERPL-SCNC: 28 MMOL/L (ref 20–32)
CREAT SERPL-MCNC: 0.82 MG/DL (ref 0.5–1.05)
EOSINOPHIL # BLD AUTO: 154 CELLS/UL (ref 15–500)
EOSINOPHIL NFR BLD AUTO: 5.3 %
ERYTHROCYTE [DISTWIDTH] IN BLOOD BY AUTOMATED COUNT: 13.2 % (ref 11–15)
GLOBULIN SER CALC-MCNC: 2.9 G/DL (CALC) (ref 1.9–3.7)
GLUCOSE SERPL-MCNC: 89 MG/DL (ref 65–99)
HCT VFR BLD AUTO: 41.5 % (ref 35–45)
HDLC SERPL-MCNC: 67 MG/DL
HGB BLD-MCNC: 13.6 G/DL (ref 11.7–15.5)
LDLC SERPL CALC-MCNC: 132 MG/DL (CALC)
LYMPHOCYTES # BLD AUTO: 693 CELLS/UL (ref 850–3900)
LYMPHOCYTES NFR BLD AUTO: 23.9 %
MCH RBC QN AUTO: 29.9 PG (ref 27–33)
MCHC RBC AUTO-ENTMCNC: 32.8 G/DL (ref 32–36)
MCV RBC AUTO: 91.2 FL (ref 80–100)
MONOCYTES # BLD AUTO: 316 CELLS/UL (ref 200–950)
MONOCYTES NFR BLD AUTO: 10.9 %
NEUTROPHILS # BLD AUTO: 1705 CELLS/UL (ref 1500–7800)
NEUTROPHILS NFR BLD AUTO: 58.8 %
NONHDLC SERPL-MCNC: 155 MG/DL (CALC)
PLATELET # BLD AUTO: 244 THOUSAND/UL (ref 140–400)
PMV BLD REES-ECKER: 11.4 FL (ref 7.5–12.5)
POTASSIUM SERPL-SCNC: 4.1 MMOL/L (ref 3.5–5.3)
PROT SERPL-MCNC: 7.1 G/DL (ref 6.1–8.1)
RBC # BLD AUTO: 4.55 MILLION/UL (ref 3.8–5.1)
SL AMB EGFR AFRICAN AMERICAN: 92 ML/MIN/1.73M2
SL AMB EGFR NON AFRICAN AMERICAN: 79 ML/MIN/1.73M2
SODIUM SERPL-SCNC: 139 MMOL/L (ref 135–146)
TRIGL SERPL-MCNC: 124 MG/DL
WBC # BLD AUTO: 2.9 THOUSAND/UL (ref 3.8–10.8)

## 2020-09-14 ENCOUNTER — TELEPHONE (OUTPATIENT)
Dept: SURGICAL ONCOLOGY | Facility: CLINIC | Age: 60
End: 2020-09-14

## 2020-09-14 DIAGNOSIS — D72.810 LYMPHOCYTOPENIA: Primary | ICD-10-CM

## 2020-09-14 NOTE — TELEPHONE ENCOUNTER
New Patient Encounter    New Patient Intake Form   Patient Details:  Stacy Butts  1960  [id number]    Background Information:  [address] starts by opening a telephone encounter and gathering the following information   Who is calling to schedule? If not self, relationship to patient? self   Referring Provider Dr Todd Coffman   What is the diagnosis? Low wbc   Is this diagnosis confirmed? Yes   When was the diagnosis? 9/2020   Is there a confirmed diagnosis from a biopsy/tissue reviewed by pathology? Were outside slides requested? NA   Is patient aware of diagnosis? Yes   Is there a personal history and what kind? Is there a family history and what kind? Reason for visit? New Diagnosis   Have you had any imaging or labs done? If so: when, where? yes     Are records in [facility]? yes   If patient has a prior history of breast cancer were old records obtained? NA   Was the patient told to bring a disk? no   Does the patient smoke or Vape? no   If yes, how many packs or cartridges per day? Scheduling Information:   Preferred [place]: [facility]     Are there any dates/time the patient cannot be seen? Miscellaneous:    After completing the above information, please route to Financial Counselor and the appropriate Nurse Navigator for review

## 2020-09-24 ENCOUNTER — OFFICE VISIT (OUTPATIENT)
Dept: FAMILY MEDICINE CLINIC | Facility: CLINIC | Age: 60
End: 2020-09-24
Payer: COMMERCIAL

## 2020-09-24 VITALS
WEIGHT: 198.6 LBS | DIASTOLIC BLOOD PRESSURE: 80 MMHG | BODY MASS INDEX: 33.09 KG/M2 | OXYGEN SATURATION: 97 % | TEMPERATURE: 99.2 F | RESPIRATION RATE: 16 BRPM | HEIGHT: 65 IN | HEART RATE: 75 BPM | SYSTOLIC BLOOD PRESSURE: 130 MMHG

## 2020-09-24 DIAGNOSIS — H92.01 RIGHT EAR PAIN: Primary | ICD-10-CM

## 2020-09-24 DIAGNOSIS — M25.50 ARTHRALGIA OF MULTIPLE JOINTS: ICD-10-CM

## 2020-09-24 PROCEDURE — 99213 OFFICE O/P EST LOW 20 MIN: CPT | Performed by: FAMILY MEDICINE

## 2020-09-24 PROCEDURE — 3075F SYST BP GE 130 - 139MM HG: CPT | Performed by: FAMILY MEDICINE

## 2020-09-24 PROCEDURE — 1036F TOBACCO NON-USER: CPT | Performed by: FAMILY MEDICINE

## 2020-09-24 PROCEDURE — 3079F DIAST BP 80-89 MM HG: CPT | Performed by: FAMILY MEDICINE

## 2020-09-24 RX ORDER — FLUTICASONE PROPIONATE 50 MCG
2 SPRAY, SUSPENSION (ML) NASAL DAILY
Qty: 16 G | Refills: 0 | Status: SHIPPED | OUTPATIENT
Start: 2020-09-24 | End: 2020-10-16

## 2020-09-24 RX ORDER — DOXYCYCLINE HYCLATE 100 MG
100 TABLET ORAL 2 TIMES DAILY
Qty: 20 TABLET | Refills: 0 | Status: SHIPPED | OUTPATIENT
Start: 2020-09-24 | End: 2020-10-04

## 2020-09-24 RX ORDER — MELOXICAM 15 MG/1
15 TABLET ORAL DAILY
Qty: 90 TABLET | Refills: 0 | Status: SHIPPED | OUTPATIENT
Start: 2020-09-24 | End: 2021-09-01 | Stop reason: SDUPTHER

## 2020-09-24 NOTE — PROGRESS NOTES
Assessment/Plan:    No problem-specific Assessment & Plan notes found for this encounter  Diagnoses and all orders for this visit:    Right ear pain  Comments:  to see ENT if not better   Orders:  -     doxycycline hyclate (VIBRA-TABS) 100 mg tablet; Take 1 tablet (100 mg total) by mouth 2 (two) times a day for 10 days  -     fluticasone (FLONASE) 50 mcg/act nasal spray; 2 sprays into each nostril daily    Arthralgia of multiple joints  -     meloxicam (MOBIC) 15 mg tablet; Take 1 tablet (15 mg total) by mouth daily Take with food or milk  Subjective:      Patient ID: Stas Cho is a 64 y o  female  Right ear pain for the last 2 weeks   No fever/chills  Feeling tired all the time for the last 3 weeks  Not snoring and not waking up gasping for air     Earache    There is pain in the right ear  This is a new problem  The current episode started 1 to 4 weeks ago  There has been no fever  The pain is mild  Pertinent negatives include no abdominal pain, coughing, diarrhea, ear discharge, headaches, hearing loss, neck pain, rash, rhinorrhea, sore throat or vomiting  She has tried nothing for the symptoms  The treatment provided mild relief  There is no history of a chronic ear infection, hearing loss or a tympanostomy tube  The following portions of the patient's history were reviewed and updated as appropriate: allergies, current medications, past family history, past medical history, past social history, past surgical history and problem list     Review of Systems   HENT: Positive for ear pain  Negative for ear discharge, hearing loss, rhinorrhea and sore throat  Respiratory: Negative for cough  Gastrointestinal: Negative for abdominal pain, diarrhea and vomiting  Musculoskeletal: Negative for neck pain  Skin: Negative for rash  Neurological: Negative for headaches           Objective:      /80 (BP Location: Left arm, Patient Position: Sitting, Cuff Size: Large)   Pulse 75 Temp 99 2 °F (37 3 °C) (Tympanic)   Resp 16   Ht 5' 5" (1 651 m)   Wt 90 1 kg (198 lb 9 6 oz)   SpO2 97%   BMI 33 05 kg/m²          Physical Exam  Constitutional:       Appearance: Normal appearance  HENT:      Right Ear: Swelling present  A middle ear effusion is present  There is no impacted cerumen  Tympanic membrane is bulging  Left Ear: There is no impacted cerumen  Nose: No congestion or rhinorrhea  Eyes:      Extraocular Movements: Extraocular movements intact  Pupils: Pupils are equal, round, and reactive to light  Neck:      Musculoskeletal: Normal range of motion and neck supple  Cardiovascular:      Rate and Rhythm: Normal rate and regular rhythm  Pulmonary:      Effort: Pulmonary effort is normal       Breath sounds: Normal breath sounds  Neurological:      Mental Status: She is alert

## 2020-10-16 DIAGNOSIS — H92.01 RIGHT EAR PAIN: ICD-10-CM

## 2020-10-16 RX ORDER — FLUTICASONE PROPIONATE 50 MCG
SPRAY, SUSPENSION (ML) NASAL
Qty: 16 ML | Refills: 3 | Status: SHIPPED | OUTPATIENT
Start: 2020-10-16

## 2020-10-27 ENCOUNTER — TELEPHONE (OUTPATIENT)
Dept: HEMATOLOGY ONCOLOGY | Facility: CLINIC | Age: 60
End: 2020-10-27

## 2020-10-28 ENCOUNTER — CONSULT (OUTPATIENT)
Dept: HEMATOLOGY ONCOLOGY | Facility: CLINIC | Age: 60
End: 2020-10-28
Payer: COMMERCIAL

## 2020-10-28 VITALS
RESPIRATION RATE: 18 BRPM | HEART RATE: 74 BPM | WEIGHT: 198.5 LBS | OXYGEN SATURATION: 98 % | TEMPERATURE: 96.5 F | DIASTOLIC BLOOD PRESSURE: 70 MMHG | HEIGHT: 65 IN | SYSTOLIC BLOOD PRESSURE: 118 MMHG | BODY MASS INDEX: 33.07 KG/M2

## 2020-10-28 DIAGNOSIS — D72.819 LEUKOPENIA, UNSPECIFIED TYPE: Primary | ICD-10-CM

## 2020-10-28 PROCEDURE — 3074F SYST BP LT 130 MM HG: CPT | Performed by: INTERNAL MEDICINE

## 2020-10-28 PROCEDURE — 99243 OFF/OP CNSLTJ NEW/EST LOW 30: CPT | Performed by: INTERNAL MEDICINE

## 2020-10-28 PROCEDURE — 3008F BODY MASS INDEX DOCD: CPT | Performed by: INTERNAL MEDICINE

## 2020-10-28 PROCEDURE — 3078F DIAST BP <80 MM HG: CPT | Performed by: INTERNAL MEDICINE

## 2020-10-30 ENCOUNTER — LAB (OUTPATIENT)
Dept: LAB | Facility: CLINIC | Age: 60
End: 2020-10-30
Payer: COMMERCIAL

## 2020-10-30 DIAGNOSIS — D72.819 LEUKOPENIA, UNSPECIFIED TYPE: ICD-10-CM

## 2020-10-30 LAB
BASOPHILS # BLD AUTO: 0.03 THOUSANDS/ΜL (ref 0–0.1)
BASOPHILS NFR BLD AUTO: 1 % (ref 0–1)
EOSINOPHIL # BLD AUTO: 0.17 THOUSAND/ΜL (ref 0–0.61)
EOSINOPHIL NFR BLD AUTO: 4 % (ref 0–6)
ERYTHROCYTE [DISTWIDTH] IN BLOOD BY AUTOMATED COUNT: 13.2 % (ref 11.6–15.1)
ERYTHROCYTE [SEDIMENTATION RATE] IN BLOOD: 15 MM/HOUR (ref 0–29)
HCT VFR BLD AUTO: 45 % (ref 34.8–46.1)
HGB BLD-MCNC: 14.4 G/DL (ref 11.5–15.4)
IMM GRANULOCYTES # BLD AUTO: 0.01 THOUSAND/UL (ref 0–0.2)
IMM GRANULOCYTES NFR BLD AUTO: 0 % (ref 0–2)
LYMPHOCYTES # BLD AUTO: 1.02 THOUSANDS/ΜL (ref 0.6–4.47)
LYMPHOCYTES NFR BLD AUTO: 26 % (ref 14–44)
MCH RBC QN AUTO: 29.5 PG (ref 26.8–34.3)
MCHC RBC AUTO-ENTMCNC: 32 G/DL (ref 31.4–37.4)
MCV RBC AUTO: 92 FL (ref 82–98)
MONOCYTES # BLD AUTO: 0.46 THOUSAND/ΜL (ref 0.17–1.22)
MONOCYTES NFR BLD AUTO: 12 % (ref 4–12)
NEUTROPHILS # BLD AUTO: 2.18 THOUSANDS/ΜL (ref 1.85–7.62)
NEUTS SEG NFR BLD AUTO: 57 % (ref 43–75)
NRBC BLD AUTO-RTO: 0 /100 WBCS
PLATELET # BLD AUTO: 258 THOUSANDS/UL (ref 149–390)
PMV BLD AUTO: 10.1 FL (ref 8.9–12.7)
RBC # BLD AUTO: 4.88 MILLION/UL (ref 3.81–5.12)
VIT B12 SERPL-MCNC: 419 PG/ML (ref 100–900)
WBC # BLD AUTO: 3.87 THOUSAND/UL (ref 4.31–10.16)

## 2020-10-30 PROCEDURE — 88185 FLOWCYTOMETRY/TC ADD-ON: CPT

## 2020-10-30 PROCEDURE — 82607 VITAMIN B-12: CPT

## 2020-10-30 PROCEDURE — 85025 COMPLETE CBC W/AUTO DIFF WBC: CPT

## 2020-10-30 PROCEDURE — 36415 COLL VENOUS BLD VENIPUNCTURE: CPT

## 2020-10-30 PROCEDURE — 85652 RBC SED RATE AUTOMATED: CPT

## 2020-10-30 PROCEDURE — 88184 FLOWCYTOMETRY/ TC 1 MARKER: CPT

## 2020-11-02 LAB — SCAN RESULT: NORMAL

## 2020-12-01 ENCOUNTER — HOSPITAL ENCOUNTER (OUTPATIENT)
Dept: ULTRASOUND IMAGING | Facility: HOSPITAL | Age: 60
Discharge: HOME | End: 2020-12-01
Attending: INTERNAL MEDICINE
Payer: COMMERCIAL

## 2020-12-01 DIAGNOSIS — D72.819 LEUKOPENIA, UNSPECIFIED TYPE: ICD-10-CM

## 2020-12-01 PROCEDURE — 76700 US EXAM ABDOM COMPLETE: CPT

## 2020-12-02 DIAGNOSIS — F41.9 ANXIETY: ICD-10-CM

## 2020-12-02 RX ORDER — ESCITALOPRAM OXALATE 10 MG/1
10 TABLET ORAL DAILY
Qty: 30 TABLET | Refills: 5 | Status: SHIPPED | OUTPATIENT
Start: 2020-12-02 | End: 2021-05-25

## 2020-12-08 ENCOUNTER — OFFICE VISIT (OUTPATIENT)
Dept: HEMATOLOGY ONCOLOGY | Facility: CLINIC | Age: 60
End: 2020-12-08
Payer: COMMERCIAL

## 2020-12-08 VITALS
BODY MASS INDEX: 32.74 KG/M2 | OXYGEN SATURATION: 97 % | DIASTOLIC BLOOD PRESSURE: 74 MMHG | SYSTOLIC BLOOD PRESSURE: 128 MMHG | WEIGHT: 196.5 LBS | TEMPERATURE: 98.1 F | RESPIRATION RATE: 18 BRPM | HEIGHT: 65 IN | HEART RATE: 63 BPM

## 2020-12-08 DIAGNOSIS — D72.819 LEUKOPENIA, UNSPECIFIED TYPE: Primary | ICD-10-CM

## 2020-12-08 DIAGNOSIS — R16.0 HEPATOMEGALY: ICD-10-CM

## 2020-12-08 PROCEDURE — 3074F SYST BP LT 130 MM HG: CPT | Performed by: INTERNAL MEDICINE

## 2020-12-08 PROCEDURE — 1036F TOBACCO NON-USER: CPT | Performed by: INTERNAL MEDICINE

## 2020-12-08 PROCEDURE — 3008F BODY MASS INDEX DOCD: CPT | Performed by: INTERNAL MEDICINE

## 2020-12-08 PROCEDURE — 3078F DIAST BP <80 MM HG: CPT | Performed by: INTERNAL MEDICINE

## 2020-12-08 PROCEDURE — 99214 OFFICE O/P EST MOD 30 MIN: CPT | Performed by: INTERNAL MEDICINE

## 2021-01-12 ENCOUNTER — IMMUNIZATIONS (OUTPATIENT)
Dept: FAMILY MEDICINE CLINIC | Facility: HOSPITAL | Age: 61
End: 2021-01-12

## 2021-01-12 DIAGNOSIS — Z23 ENCOUNTER FOR IMMUNIZATION: ICD-10-CM

## 2021-01-12 PROCEDURE — 91300 SARS-COV-2 / COVID-19 MRNA VACCINE (PFIZER-BIONTECH) 30 MCG: CPT

## 2021-01-12 PROCEDURE — 0001A SARS-COV-2 / COVID-19 MRNA VACCINE (PFIZER-BIONTECH) 30 MCG: CPT

## 2021-02-06 ENCOUNTER — IMMUNIZATIONS (OUTPATIENT)
Dept: FAMILY MEDICINE CLINIC | Facility: HOSPITAL | Age: 61
End: 2021-02-06

## 2021-02-06 DIAGNOSIS — Z23 ENCOUNTER FOR IMMUNIZATION: Primary | ICD-10-CM

## 2021-02-06 PROCEDURE — 0002A SARS-COV-2 / COVID-19 MRNA VACCINE (PFIZER-BIONTECH) 30 MCG: CPT

## 2021-02-06 PROCEDURE — 91300 SARS-COV-2 / COVID-19 MRNA VACCINE (PFIZER-BIONTECH) 30 MCG: CPT

## 2021-05-25 DIAGNOSIS — F41.9 ANXIETY: ICD-10-CM

## 2021-05-25 RX ORDER — ESCITALOPRAM OXALATE 10 MG/1
TABLET ORAL
Qty: 90 TABLET | Refills: 1 | Status: SHIPPED | OUTPATIENT
Start: 2021-05-25 | End: 2021-09-01 | Stop reason: SDUPTHER

## 2021-08-25 ENCOUNTER — RA CDI HCC (OUTPATIENT)
Dept: OTHER | Facility: HOSPITAL | Age: 61
End: 2021-08-25

## 2021-08-25 NOTE — PROGRESS NOTES
[facility] [address]  coding opportunities          Number of diagnosis code(s) already on the problem list added to FYI fla               Number of suggestions used: 1         Patients insurance company: [employer] (Medicare Advantage and Commercial)           [facility]  coding opportunities          Number of diagnosis code(s) already on the problem list added to [employer] fla                     Patients insurance company: [employer] (Medicare Advantage and Commercial)           Dear Dr Dex Urbano,    Re: Gaby Ya    Based on clinical documentation indicated in the medical record, it appears that the patient may have the following condition:    M35 9 - Connective tissue disease    If this is correct, please document, assess, and code at your next visit on 2021      Thank you,    Marla Obrien

## 2021-09-01 ENCOUNTER — OFFICE VISIT (OUTPATIENT)
Dept: FAMILY MEDICINE CLINIC | Facility: CLINIC | Age: 61
End: 2021-09-01
Payer: COMMERCIAL

## 2021-09-01 VITALS
RESPIRATION RATE: 16 BRPM | HEART RATE: 66 BPM | HEIGHT: 65 IN | DIASTOLIC BLOOD PRESSURE: 72 MMHG | SYSTOLIC BLOOD PRESSURE: 120 MMHG | WEIGHT: 198.8 LBS | TEMPERATURE: 96.2 F | BODY MASS INDEX: 33.12 KG/M2 | OXYGEN SATURATION: 97 %

## 2021-09-01 DIAGNOSIS — Z00.00 ANNUAL PHYSICAL EXAM: Primary | ICD-10-CM

## 2021-09-01 DIAGNOSIS — M25.50 ARTHRALGIA OF MULTIPLE JOINTS: ICD-10-CM

## 2021-09-01 DIAGNOSIS — Z12.31 VISIT FOR SCREENING MAMMOGRAM: ICD-10-CM

## 2021-09-01 DIAGNOSIS — M35.9 CONNECTIVE TISSUE DISEASE (HCC): ICD-10-CM

## 2021-09-01 DIAGNOSIS — F41.9 ANXIETY: ICD-10-CM

## 2021-09-01 DIAGNOSIS — Z23 NEED FOR SHINGLES VACCINE: ICD-10-CM

## 2021-09-01 PROCEDURE — 90750 HZV VACC RECOMBINANT IM: CPT

## 2021-09-01 PROCEDURE — 99396 PREV VISIT EST AGE 40-64: CPT | Performed by: FAMILY MEDICINE

## 2021-09-01 PROCEDURE — 1036F TOBACCO NON-USER: CPT | Performed by: FAMILY MEDICINE

## 2021-09-01 PROCEDURE — 3725F SCREEN DEPRESSION PERFORMED: CPT | Performed by: FAMILY MEDICINE

## 2021-09-01 PROCEDURE — 3008F BODY MASS INDEX DOCD: CPT | Performed by: FAMILY MEDICINE

## 2021-09-01 PROCEDURE — 90471 IMMUNIZATION ADMIN: CPT

## 2021-09-01 RX ORDER — ESCITALOPRAM OXALATE 10 MG/1
10 TABLET ORAL DAILY
Qty: 90 TABLET | Refills: 3 | Status: SHIPPED | OUTPATIENT
Start: 2021-09-01

## 2021-09-01 RX ORDER — MELOXICAM 15 MG/1
15 TABLET ORAL DAILY
Qty: 90 TABLET | Refills: 0 | Status: SHIPPED | OUTPATIENT
Start: 2021-09-01 | End: 2021-11-24

## 2021-09-01 NOTE — PROGRESS NOTES
[address] [facility] [address]    NAME: Johanne Dye  AGE: 64 y o  SEX: female  : 1960     DATE: 2021     Assessment and Plan:     Problem List Items Addressed This Visit        Other    Connective tissue disease ([address] )    Relevant Medications    meloxicam (MOBIC) 15 mg tablet      Other Visit Diagnoses     Annual physical exam    -  Primary    Relevant Orders    Comprehensive metabolic panel    Lipid Panel with Direct LDL reflex    HIV 1/2 Antigen/Antibody (4th Generation) w Reflex [facility]    CBC and differential    Visit for screening mammogram        Relevant Orders    Mammo screening bilateral w 3d & cad    Anxiety        Relevant Medications    escitalopram (LEXAPRO) 10 mg tablet    Arthralgia of multiple joints        Relevant Medications    meloxicam (MOBIC) 15 mg tablet    Need for shingles vaccine        Relevant Orders    Zoster Vaccine Recombinant IM          Immunizations and preventive care screenings were discussed with patient today  Appropriate education was printed on patient's after visit summary  Counseling:  Alcohol/drug use: discussed moderation in alcohol intake, the recommendations for healthy alcohol use, and avoidance of illicit drug use  Dental Health: discussed importance of regular tooth brushing, flossing, and dental visits  Injury prevention: discussed safety/seat belts, safety helmets, smoke detectors, carbon dioxide detectors, and smoking near bedding or upholstery  Sexual health: discussed sexually transmitted diseases, partner selection, use of condoms, avoidance of unintended pregnancy, and contraceptive alternatives  · Exercise: the importance of regular exercise/physical activity was discussed  Recommend exercise 3-5 times per week for at least 30 minutes  BMI Counseling: Body mass index is 33 08 kg/m²   The BMI is above normal  Nutrition recommendations include decreasing portion sizes and encouraging healthy choices of fruits and vegetables  Exercise recommendations include moderate physical activity 150 minutes/week  No pharmacotherapy was ordered  No follow-ups on file  Chief Complaint:     Chief Complaint   Patient presents with    Physical Exam     Patient is here today for an annual exam       History of Present Illness:     Adult Annual Physical   Patient here for a comprehensive physical exam  The patient reports no problems  Diet and Physical Activity  · Diet/Nutrition: well balanced diet and consuming 3-5 servings of fruits/vegetables daily  · Exercise: walking  Depression Screening  PHQ-9 Depression Screening    PHQ-9:   Frequency of the following problems over the past two weeks:      Little interest or pleasure in doing things: 0 - not at all  Feeling down, depressed, or hopeless: 0 - not at all  Trouble falling or staying asleep, or sleeping too much: 0 - not at all  Feeling tired or having little energy: 0 - not at all  Poor appetite or overeatin - not at all  Feeling bad about yourself - or that you are a failure or have let yourself or your family down: 0 - not at all  Trouble concentrating on things, such as reading the newspaper or watching television: 0 - not at all  Moving or speaking so slowly that other people could have noticed  Or the opposite - being so fidgety or restless that you have been moving around a lot more than usual: 0 - not at all  Thoughts that you would be better off dead, or of hurting yourself in some way: 0 - not at all  PHQ-2 Score: 0  PHQ-9 Score: 0       General Health  · Sleep: sleeps well and gets 7-8 hours of sleep on average  · Hearing: normal - bilateral   · Vision: most recent eye exam <1 year ago and wears glasses  · Dental: regular dental visits and brushes teeth twice daily  /GYN Health  · Patient is: postmenopausal  · Last menstrual period: more than 10 years ago  · Contraceptive method: none       Review of Systems:     Review of Systems   Constitutional: Negative  HENT: Negative  Eyes: Negative  Respiratory: Negative  Cardiovascular: Negative  Gastrointestinal: Negative  Endocrine: Negative  Genitourinary: Negative  Musculoskeletal: Negative  Skin: Negative  Allergic/Immunologic: Negative  Neurological: Negative  Hematological: Negative  Psychiatric/Behavioral: Negative  Past Medical History:     Past Medical History:   Diagnosis Date    Anxiety     Arthritis     Colon polyp     Neoplasm of ovary     Bilateral    PONV (postoperative nausea and vomiting)       Past Surgical History:     Past Surgical History:   Procedure Laterality Date    CHOLECYSTECTOMY      COLONOSCOPY      ND WRIST Rosaline Mourning LIG Right 6/2/2017    Procedure: ENDOSCOPIC CARPAL TUNNEL RELEASE ;  Surgeon: Karissa Orellana MD;  [facility];  Service: Orthopedics    UTERINE FIBROID SURGERY        Social History:     Social History     Socioeconomic History    Marital status: /Civil Union     Spouse name: None    Number of children: None    Years of education: None    Highest education level: None   Occupational History    Occupation:    Tobacco Use    Smoking status: Never Smoker    Smokeless tobacco: Never Used   Vaping Use    Vaping Use: Never used   Substance and Sexual Activity    Alcohol use: Yes     Comment:   Alcohol use (1/mo)    Drug use: No    Sexual activity: Never   Other Topics Concern    None   Social History Narrative    Exercise habits:  1x week for 45 min    Living situations: With , son, daughter, dog     Social Determinants of Health     Financial Resource Strain:     Difficulty of Paying Living Expenses:    Food Insecurity:     Worried About [address] in the Last Year:     [address] in the Last Year:    Transportation Needs:     Lack of Transportation (Medical):      Lack of Transportation (Non-Medical):    Physical Activity:     Days of Exercise per Week:     Minutes of Exercise per Session:    Stress:     Feeling of Stress :    Social Connections:     Frequency of Communication with Friends and Family:     Frequency of Social Gatherings with Friends and Family:     Attends Jewish Services:     Active Member of Clubs or Organizations:     Attends Club or Organization Meetings:     Marital Status:    Intimate Partner Violence:     Fear of Current or Ex-Partner:     Emotionally Abused:     Physically Abused:     Sexually Abused:       Family History:     Family History   Problem Relation Age of Onset    Hypothyroidism Mother     Dementia Mother     Aortic aneurysm Mother         Abdominal    Arthritis Mother     Diverticulitis Mother     Pancreatitis Mother     Other Mother         Tachycardia    No Known Problems Father     Breast cancer Sister 64    Other Sister         Neoplasm of the brain, Nontropical (celiac) sprue    Diabetes Paternal Grandmother     Colon cancer Maternal Aunt       Current Medications:     Current Outpatient Medications   Medication Sig Dispense Refill    Calcium Carb-Cholecalciferol (CALTRATE 600+D) 600-800 MG-UNIT TABS Take by mouth      cetirizine (ZYRTEC ALLERGY) 10 mg tablet Take 1 tablet by mouth as needed       cholecalciferol (VITAMIN D3) 1,000 units tablet Take 1 tablet by mouth daily      escitalopram (LEXAPRO) 10 mg tablet Take 1 tablet (10 mg total) by mouth daily 90 tablet 3    fluticasone (FLONASE) 50 mcg/act nasal spray SPRAY 2 SPRAYS INTO EACH NOSTRIL EVERY DAY 16 mL 3    meloxicam (MOBIC) 15 mg tablet Take 1 tablet (15 mg total) by mouth daily Take with food or milk  90 tablet 0    Multiple Vitamins-Minerals (WOMENS MULTIVITAMIN PO) Take by mouth daily       No current facility-administered medications for this visit  Allergies:      Allergies   Allergen Reactions    Penicillins Other (See Comments), Itching and Rash Serum sickness  Pt developed "crystals", and itchy rash    Amoxicillin Rash and Syncope      Physical Exam:     /72 (BP Location: Left arm, Patient Position: Sitting, Cuff Size: Adult)   Pulse 66   Temp (!) 96 2 °F (35 7 °C) (Tympanic)   Resp 16   Ht 5' 5" (1 651 m)   Wt 90 2 kg (198 lb 12 8 oz)   SpO2 97%   BMI 33 08 kg/m²     Physical Exam  Constitutional:       Appearance: She is well-developed  HENT:      Head: Normocephalic and atraumatic  Eyes:      Pupils: Pupils are equal, round, and reactive to light  Cardiovascular:      Rate and Rhythm: Normal rate and regular rhythm  Pulmonary:      Effort: Pulmonary effort is normal  No respiratory distress  Breath sounds: Normal breath sounds  No wheezing  Abdominal:      General: Bowel sounds are normal       Palpations: Abdomen is soft  Musculoskeletal:         General: No deformity  Normal range of motion  Cervical back: Normal range of motion and neck supple  Skin:     General: Skin is warm  Neurological:      Mental Status: She is alert and oriented to person, place, and time     Psychiatric:         Behavior: Behavior normal           Juli Roberson MD  [facility]

## 2021-09-01 NOTE — PATIENT INSTRUCTIONS

## 2021-09-11 ENCOUNTER — APPOINTMENT (OUTPATIENT)
Dept: LAB | Facility: CLINIC | Age: 61
End: 2021-09-11
Payer: COMMERCIAL

## 2021-09-11 DIAGNOSIS — Z00.00 ANNUAL PHYSICAL EXAM: ICD-10-CM

## 2021-09-11 LAB
ALBUMIN SERPL BCP-MCNC: 3.9 G/DL (ref 3.5–5)
ALP SERPL-CCNC: 75 U/L (ref 46–116)
ALT SERPL W P-5'-P-CCNC: 38 U/L (ref 12–78)
ANION GAP SERPL CALCULATED.3IONS-SCNC: 9 MMOL/L (ref 4–13)
AST SERPL W P-5'-P-CCNC: 24 U/L (ref 5–45)
BASOPHILS # BLD AUTO: 0.03 THOUSANDS/ΜL (ref 0–0.1)
BASOPHILS NFR BLD AUTO: 1 % (ref 0–1)
BILIRUB SERPL-MCNC: 0.67 MG/DL (ref 0.2–1)
BUN SERPL-MCNC: 16 MG/DL (ref 5–25)
CALCIUM SERPL-MCNC: 10.1 MG/DL (ref 8.3–10.1)
CHLORIDE SERPL-SCNC: 104 MMOL/L (ref 100–108)
CHOLEST SERPL-MCNC: 246 MG/DL (ref 50–200)
CO2 SERPL-SCNC: 28 MMOL/L (ref 21–32)
CREAT SERPL-MCNC: 0.81 MG/DL (ref 0.6–1.3)
EOSINOPHIL # BLD AUTO: 0.17 THOUSAND/ΜL (ref 0–0.61)
EOSINOPHIL NFR BLD AUTO: 5 % (ref 0–6)
ERYTHROCYTE [DISTWIDTH] IN BLOOD BY AUTOMATED COUNT: 13.2 % (ref 11.6–15.1)
GFR SERPL CREATININE-BSD FRML MDRD: 80 ML/MIN/1.73SQ M
GLUCOSE P FAST SERPL-MCNC: 89 MG/DL (ref 65–99)
HCT VFR BLD AUTO: 44.6 % (ref 34.8–46.1)
HDLC SERPL-MCNC: 74 MG/DL
HGB BLD-MCNC: 14.4 G/DL (ref 11.5–15.4)
IMM GRANULOCYTES # BLD AUTO: 0 THOUSAND/UL (ref 0–0.2)
IMM GRANULOCYTES NFR BLD AUTO: 0 % (ref 0–2)
LDLC SERPL CALC-MCNC: 149 MG/DL (ref 0–100)
LYMPHOCYTES # BLD AUTO: 0.88 THOUSANDS/ΜL (ref 0.6–4.47)
LYMPHOCYTES NFR BLD AUTO: 25 % (ref 14–44)
MCH RBC QN AUTO: 29.4 PG (ref 26.8–34.3)
MCHC RBC AUTO-ENTMCNC: 32.3 G/DL (ref 31.4–37.4)
MCV RBC AUTO: 91 FL (ref 82–98)
MONOCYTES # BLD AUTO: 0.55 THOUSAND/ΜL (ref 0.17–1.22)
MONOCYTES NFR BLD AUTO: 16 % (ref 4–12)
NEUTROPHILS # BLD AUTO: 1.92 THOUSANDS/ΜL (ref 1.85–7.62)
NEUTS SEG NFR BLD AUTO: 53 % (ref 43–75)
NRBC BLD AUTO-RTO: 0 /100 WBCS
PLATELET # BLD AUTO: 259 THOUSANDS/UL (ref 149–390)
PMV BLD AUTO: 10.7 FL (ref 8.9–12.7)
POTASSIUM SERPL-SCNC: 4.2 MMOL/L (ref 3.5–5.3)
PROT SERPL-MCNC: 7.9 G/DL (ref 6.4–8.2)
RBC # BLD AUTO: 4.9 MILLION/UL (ref 3.81–5.12)
SODIUM SERPL-SCNC: 141 MMOL/L (ref 136–145)
TRIGL SERPL-MCNC: 115 MG/DL
WBC # BLD AUTO: 3.55 THOUSAND/UL (ref 4.31–10.16)

## 2021-09-11 PROCEDURE — 36415 COLL VENOUS BLD VENIPUNCTURE: CPT | Performed by: FAMILY MEDICINE

## 2021-09-11 PROCEDURE — 80053 COMPREHEN METABOLIC PANEL: CPT | Performed by: FAMILY MEDICINE

## 2021-09-11 PROCEDURE — 80061 LIPID PANEL: CPT

## 2021-09-11 PROCEDURE — 85025 COMPLETE CBC W/AUTO DIFF WBC: CPT | Performed by: FAMILY MEDICINE

## 2021-09-11 PROCEDURE — 87389 HIV-1 AG W/HIV-1&-2 AB AG IA: CPT

## 2021-09-13 LAB — HIV 1+2 AB+HIV1 P24 AG SERPL QL IA: NORMAL

## 2021-09-14 ENCOUNTER — HOSPITAL ENCOUNTER (OUTPATIENT)
Dept: RADIOLOGY | Age: 61
Discharge: HOME | End: 2021-09-14
Payer: COMMERCIAL

## 2021-09-14 VITALS — BODY MASS INDEX: 32.49 KG/M2 | HEIGHT: 65 IN | WEIGHT: 195 LBS

## 2021-09-14 DIAGNOSIS — Z12.31 VISIT FOR SCREENING MAMMOGRAM: ICD-10-CM

## 2021-09-14 PROCEDURE — 77063 BREAST TOMOSYNTHESIS BI: CPT

## 2021-09-14 PROCEDURE — 77067 SCR MAMMO BI INCL CAD: CPT

## 2021-11-24 DIAGNOSIS — M25.50 ARTHRALGIA OF MULTIPLE JOINTS: ICD-10-CM

## 2021-11-24 RX ORDER — MELOXICAM 15 MG/1
15 TABLET ORAL DAILY
Qty: 90 TABLET | Refills: 0 | Status: SHIPPED | OUTPATIENT
Start: 2021-11-24 | End: 2022-02-20 | Stop reason: SDUPTHER

## 2022-01-26 ENCOUNTER — VBI (OUTPATIENT)
Dept: ADMINISTRATIVE | Facility: OTHER | Age: 62
End: 2022-01-26

## 2022-02-19 DIAGNOSIS — M25.50 ARTHRALGIA OF MULTIPLE JOINTS: ICD-10-CM

## 2022-02-20 RX ORDER — MELOXICAM 15 MG/1
15 TABLET ORAL DAILY
Qty: 90 TABLET | Refills: 0 | Status: SHIPPED | OUTPATIENT
Start: 2022-02-20

## 2022-03-02 ENCOUNTER — CLINICAL SUPPORT (OUTPATIENT)
Dept: FAMILY MEDICINE CLINIC | Facility: CLINIC | Age: 62
End: 2022-03-02
Payer: COMMERCIAL

## 2022-03-02 DIAGNOSIS — Z23 NEED FOR SHINGLES VACCINE: Primary | ICD-10-CM

## 2022-03-02 PROCEDURE — 90750 HZV VACC RECOMBINANT IM: CPT

## 2022-03-02 PROCEDURE — 90471 IMMUNIZATION ADMIN: CPT

## 2022-03-02 NOTE — PROGRESS NOTES
Assessment/Plan:    No problem-specific Assessment & Plan notes found for this encounter  There are no diagnoses linked to this encounter  Subjective:      Patient ID: Violet Morton is a 63 y o  female  HPI    Patient here for Shingrix #2    Review of Systems      Objective: There were no vitals taken for this visit           Physical Exam

## 2022-09-01 ENCOUNTER — RA CDI HCC (OUTPATIENT)
Dept: OTHER | Facility: HOSPITAL | Age: 62
End: 2022-09-01

## 2022-09-01 NOTE — PROGRESS NOTES
[facility]  coding opportunities       Chart reviewed, no opportunity found: CHART REVIEWED, NO OPPORTUNITY FOUND        Patients Insurance        Commercial Insurance: [employer]

## 2022-09-06 ENCOUNTER — OFFICE VISIT (OUTPATIENT)
Dept: FAMILY MEDICINE CLINIC | Facility: CLINIC | Age: 62
End: 2022-09-06
Payer: COMMERCIAL

## 2022-09-06 VITALS
WEIGHT: 198.4 LBS | TEMPERATURE: 97.2 F | RESPIRATION RATE: 16 BRPM | BODY MASS INDEX: 33.87 KG/M2 | DIASTOLIC BLOOD PRESSURE: 80 MMHG | SYSTOLIC BLOOD PRESSURE: 126 MMHG | HEART RATE: 65 BPM | HEIGHT: 64 IN | OXYGEN SATURATION: 97 %

## 2022-09-06 DIAGNOSIS — Z12.31 VISIT FOR SCREENING MAMMOGRAM: ICD-10-CM

## 2022-09-06 DIAGNOSIS — Z00.00 ANNUAL PHYSICAL EXAM: Primary | ICD-10-CM

## 2022-09-06 DIAGNOSIS — Z13.820 OSTEOPOROSIS SCREENING: ICD-10-CM

## 2022-09-06 DIAGNOSIS — F32.0 MAJOR DEPRESSIVE DISORDER, SINGLE EPISODE, MILD (HCC): ICD-10-CM

## 2022-09-06 DIAGNOSIS — M35.9 CONNECTIVE TISSUE DISEASE (HCC): ICD-10-CM

## 2022-09-06 PROCEDURE — 99396 PREV VISIT EST AGE 40-64: CPT | Performed by: FAMILY MEDICINE

## 2022-09-06 PROCEDURE — 3725F SCREEN DEPRESSION PERFORMED: CPT | Performed by: FAMILY MEDICINE

## 2022-09-06 NOTE — PATIENT INSTRUCTIONS

## 2022-09-06 NOTE — PROGRESS NOTES
[address]    NAME: Junita Severance  AGE: 63 y o  SEX: female  : 1960     DATE: 2022     Assessment and Plan:     Problem List Items Addressed This Visit        Other    Connective tissue disease ([address] )    Relevant Orders    Ambulatory Referral to Rheumatology    Comprehensive metabolic panel    CBC and differential    Anti-DNA antibody, double-stranded    C-reactive protein    Sjogren's Antibodies    RF Screen w/ Reflex to Titer    Celiac Disease Antibody Profile    Cyclic citrul peptide antibody, IgG    Lipid Panel with Direct LDL reflex    Major depressive disorder, single episode, mild (HCC)     lexapro as directed            Other Visit Diagnoses     Annual physical exam    -  Primary    Relevant Orders    Comprehensive metabolic panel    CBC and differential    Lipid Panel with Direct LDL reflex    Visit for screening mammogram        Relevant Orders    Mammo screening bilateral w 3d & cad    Osteoporosis screening        Relevant Orders    DXA bone density spine hip and pelvis          Immunizations and preventive care screenings were discussed with patient today  Appropriate education was printed on patient's after visit summary  Counseling:  Alcohol/drug use: discussed moderation in alcohol intake, the recommendations for healthy alcohol use, and avoidance of illicit drug use  Dental Health: discussed importance of regular tooth brushing, flossing, and dental visits  Injury prevention: discussed safety/seat belts, safety helmets, smoke detectors, carbon dioxide detectors, and smoking near bedding or upholstery  Sexual health: discussed sexually transmitted diseases, partner selection, use of condoms, avoidance of unintended pregnancy, and contraceptive alternatives  Exercise: the importance of regular exercise/physical activity was discussed  Recommend exercise 3-5 times per week for at least 30 minutes  BMI Counseling: Body mass index is 33 83 kg/m²  The BMI is above normal  Nutrition recommendations include decreasing portion sizes and encouraging healthy choices of fruits and vegetables  Exercise recommendations include moderate physical activity 150 minutes/week  No pharmacotherapy was ordered  Rationale for BMI follow-up plan is due to patient being overweight or obese  No follow-ups on file  Chief Complaint:     Chief Complaint   Patient presents with    Physical Exam     Patient is here today for annual exam       History of Present Illness:     Adult Annual Physical   Patient here for a comprehensive physical exam  The patient reports no problems  Diet and Physical Activity  Diet/Nutrition: well balanced diet and consuming 3-5 servings of fruits/vegetables daily  Exercise: walking  Depression Screening  PHQ-2/9 Depression Screening    Little interest or pleasure in doing things: 0 - not at all  Feeling down, depressed, or hopeless: 0 - not at all  Trouble falling or staying asleep, or sleeping too much: 0 - not at all  Feeling tired or having little energy: 1 - several days  Poor appetite or overeatin - not at all  Feeling bad about yourself - or that you are a failure or have let yourself or your family down: 1 - several days  Trouble concentrating on things, such as reading the newspaper or watching television: 0 - not at all  Moving or speaking so slowly that other people could have noticed  Or the opposite - being so fidgety or restless that you have been moving around a lot more than usual: 0 - not at all  Thoughts that you would be better off dead, or of hurting yourself in some way: 0 - not at all  PHQ-9 Score: 2   PHQ-9 Interpretation: No or Minimal depression        General Health  Sleep: sleeps well and gets 7-8 hours of sleep on average  Hearing: normal - bilateral   Vision: goes for regular eye exams and most recent eye exam <1 year ago     Dental: regular dental visits and brushes teeth twice daily  /GYN Health  Patient is: postmenopausal  Last menstrual period:   Contraceptive method: none  Review of Systems:     Review of Systems   Constitutional: Negative  HENT: Negative  Eyes: Negative  Respiratory: Negative  Cardiovascular: Negative  Gastrointestinal: Negative  Endocrine: Negative  Genitourinary: Negative  Musculoskeletal: Negative  Skin: Negative  Allergic/Immunologic: Negative  Neurological: Negative  Hematological: Negative  Psychiatric/Behavioral: Negative  Past Medical History:     Past Medical History:   Diagnosis Date    Anxiety     Arthritis     Colon polyp     Neoplasm of ovary     Bilateral    PONV (postoperative nausea and vomiting)       Past Surgical History:     Past Surgical History:   Procedure Laterality Date    CHOLECYSTECTOMY      COLONOSCOPY      GA WRIST Eliseo Narrow LIG Right 6/2/2017    Procedure: ENDOSCOPIC CARPAL TUNNEL RELEASE ;  Surgeon: Shereen Verdin MD;  Location: [facility];  Service: Orthopedics    UTERINE FIBROID SURGERY        Social History:     Social History     Socioeconomic History    Marital status: /Civil Union     Spouse name: None    Number of children: None    Years of education: None    Highest education level: None   Occupational History    Occupation:    Tobacco Use    Smoking status: Never Smoker    Smokeless tobacco: Never Used   Vaping Use    Vaping Use: Never used   Substance and Sexual Activity    Alcohol use: Yes     Comment:   Alcohol use (1/mo)    Drug use: No    Sexual activity: Never   Other Topics Concern    None   Social History Narrative    Exercise habits:  1x week for 45 min    Living situations:   With , son, daughter, dog     Social Determinants of Health     Financial Resource Strain: Not on file   Food Insecurity: Not on file   Transportation Needs: Not on file   Physical Activity: Not on file   Stress: Not on file   Social Connections: Not on file   Intimate Partner Violence: Not on file   Housing Stability: Not on file      Family History:     Family History   Problem Relation Age of Onset    Hypothyroidism Mother     Dementia Mother     Aortic aneurysm Mother         Abdominal    Arthritis Mother     Diverticulitis Mother     Pancreatitis Mother     Other Mother         Tachycardia    No Known Problems Father     Breast cancer Sister 64    Other Sister         Neoplasm of the brain, Nontropical (celiac) sprue    Diabetes Paternal Grandmother     Colon cancer Maternal Aunt       Current Medications:     Current Outpatient Medications   Medication Sig Dispense Refill    Calcium Carb-Cholecalciferol 600-800 MG-UNIT TABS Take by mouth      cetirizine (ZyrTEC) 10 mg tablet Take 1 tablet by mouth as needed       cholecalciferol (VITAMIN D3) 1,000 units tablet Take 1 tablet by mouth daily      escitalopram (LEXAPRO) 10 mg tablet Take 1 tablet (10 mg total) by mouth daily 90 tablet 3    fluticasone (FLONASE) 50 mcg/act nasal spray SPRAY 2 SPRAYS INTO EACH NOSTRIL EVERY DAY (Patient taking differently: into each nostril if needed) 16 mL 3    meloxicam (MOBIC) 15 mg tablet TAKE 1 TABLET (15 MG TOTAL) BY MOUTH DAILY TAKE WITH FOOD OR MILK  (Patient taking differently: Take 15 mg by mouth if needed Take with food or milk ) 90 tablet 0    Multiple Vitamins-Minerals (WOMENS MULTIVITAMIN PO) Take by mouth daily       No current facility-administered medications for this visit  Allergies:      Allergies   Allergen Reactions    Penicillins Other (See Comments), Itching and Rash     Serum sickness  Pt developed "crystals", and itchy rash    Amoxicillin Rash and Syncope      Physical Exam:     /80 (BP Location: Left arm, Patient Position: Sitting, Cuff Size: Adult)   Pulse 65   Temp (!) 97 2 °F (36 2 °C) (Tympanic)   Resp 16   Ht 5' 4 21" (1 631 m)   Wt 90 kg (198 lb 6 4 oz)   SpO2 97%   BMI 33 83 kg/m²     Physical Exam  Vitals and nursing note reviewed  Constitutional:       Appearance: Normal appearance  She is well-developed  HENT:      Head: Normocephalic and atraumatic  Right Ear: Tympanic membrane normal       Left Ear: Tympanic membrane normal       Nose: Nose normal       Mouth/Throat:      Mouth: Mucous membranes are moist    Eyes:      Pupils: Pupils are equal, round, and reactive to light  Cardiovascular:      Rate and Rhythm: Normal rate and regular rhythm  Pulmonary:      Effort: Pulmonary effort is normal  No respiratory distress  Breath sounds: Normal breath sounds  No wheezing  Abdominal:      General: Bowel sounds are normal       Palpations: Abdomen is soft  Musculoskeletal:         General: No deformity  Normal range of motion  Cervical back: Normal range of motion and neck supple  Skin:     General: Skin is warm  Capillary Refill: Capillary refill takes less than 2 seconds  Neurological:      General: No focal deficit present  Mental Status: She is alert and oriented to person, place, and time     Psychiatric:         Mood and Affect: Mood normal          Behavior: Behavior normal           Luis Felipe Thurston MD  [address] [facility]

## 2022-09-07 ENCOUNTER — TELEPHONE (OUTPATIENT)
Dept: ADMINISTRATIVE | Facility: OTHER | Age: 62
End: 2022-09-07

## 2022-09-07 NOTE — TELEPHONE ENCOUNTER
Upon review of the In Basket request we were able to locate, review, and update the patient chart as requested for Pap Smear (HPV) aka Cervical Cancer Screening  Any additional questions or concerns should be emailed to the Practice Liaisons via [email or web address] email, please do not reply via In Basket      Thank you  Gertrude Martinez

## 2022-09-07 NOTE — TELEPHONE ENCOUNTER
----- Message from Siobhan Kurtz sent at 9/6/2022  3:37 PM EDT -----  Regarding: care gap request  09/06/22 3:37 PM    Hello, our patient attached above has had Pap Smear (HPV) aka Cervical Cancer Screening completed/performed  Please assist in updating the patient chart by pulling the Care Everywhere (CE) document  The date of service is 1/2022       Thank you,  Siobhan Kurtz  [facility]

## 2022-09-17 ENCOUNTER — APPOINTMENT (OUTPATIENT)
Dept: LAB | Facility: CLINIC | Age: 62
End: 2022-09-17
Payer: COMMERCIAL

## 2022-09-17 DIAGNOSIS — M35.9 CONNECTIVE TISSUE DISEASE (HCC): ICD-10-CM

## 2022-09-17 DIAGNOSIS — Z00.00 ANNUAL PHYSICAL EXAM: ICD-10-CM

## 2022-09-17 LAB
ALBUMIN SERPL BCP-MCNC: 4.2 G/DL (ref 3.5–5)
ALP SERPL-CCNC: 62 U/L (ref 34–104)
ALT SERPL W P-5'-P-CCNC: 20 U/L (ref 7–52)
ANION GAP SERPL CALCULATED.3IONS-SCNC: 5 MMOL/L (ref 4–13)
AST SERPL W P-5'-P-CCNC: 19 U/L (ref 13–39)
BASOPHILS # BLD AUTO: 0.03 THOUSANDS/ΜL (ref 0–0.1)
BASOPHILS NFR BLD AUTO: 1 % (ref 0–1)
BILIRUB SERPL-MCNC: 0.62 MG/DL (ref 0.2–1)
BUN SERPL-MCNC: 14 MG/DL (ref 5–25)
CALCIUM SERPL-MCNC: 9.6 MG/DL (ref 8.4–10.2)
CHLORIDE SERPL-SCNC: 106 MMOL/L (ref 96–108)
CHOLEST SERPL-MCNC: 221 MG/DL
CO2 SERPL-SCNC: 28 MMOL/L (ref 21–32)
CREAT SERPL-MCNC: 0.71 MG/DL (ref 0.6–1.3)
CRP SERPL QL: 6.2 MG/L
EOSINOPHIL # BLD AUTO: 0.12 THOUSAND/ΜL (ref 0–0.61)
EOSINOPHIL NFR BLD AUTO: 4 % (ref 0–6)
ERYTHROCYTE [DISTWIDTH] IN BLOOD BY AUTOMATED COUNT: 13.1 % (ref 11.6–15.1)
GFR SERPL CREATININE-BSD FRML MDRD: 93 ML/MIN/1.73SQ M
GLUCOSE P FAST SERPL-MCNC: 92 MG/DL (ref 65–99)
HCT VFR BLD AUTO: 43.4 % (ref 34.8–46.1)
HDLC SERPL-MCNC: 65 MG/DL
HGB BLD-MCNC: 14.6 G/DL (ref 11.5–15.4)
IMM GRANULOCYTES # BLD AUTO: 0 THOUSAND/UL (ref 0–0.2)
IMM GRANULOCYTES NFR BLD AUTO: 0 % (ref 0–2)
LDLC SERPL CALC-MCNC: 129 MG/DL (ref 0–100)
LYMPHOCYTES # BLD AUTO: 0.74 THOUSANDS/ΜL (ref 0.6–4.47)
LYMPHOCYTES NFR BLD AUTO: 24 % (ref 14–44)
MCH RBC QN AUTO: 31.1 PG (ref 26.8–34.3)
MCHC RBC AUTO-ENTMCNC: 33.6 G/DL (ref 31.4–37.4)
MCV RBC AUTO: 93 FL (ref 82–98)
MONOCYTES # BLD AUTO: 0.32 THOUSAND/ΜL (ref 0.17–1.22)
MONOCYTES NFR BLD AUTO: 11 % (ref 4–12)
NEUTROPHILS # BLD AUTO: 1.82 THOUSANDS/ΜL (ref 1.85–7.62)
NEUTS SEG NFR BLD AUTO: 60 % (ref 43–75)
NRBC BLD AUTO-RTO: 0 /100 WBCS
PLATELET # BLD AUTO: 248 THOUSANDS/UL (ref 149–390)
PMV BLD AUTO: 9.8 FL (ref 8.9–12.7)
POTASSIUM SERPL-SCNC: 4.1 MMOL/L (ref 3.5–5.3)
PROT SERPL-MCNC: 7.2 G/DL (ref 6.4–8.4)
RBC # BLD AUTO: 4.69 MILLION/UL (ref 3.81–5.12)
SODIUM SERPL-SCNC: 139 MMOL/L (ref 135–147)
TRIGL SERPL-MCNC: 137 MG/DL
WBC # BLD AUTO: 3.03 THOUSAND/UL (ref 4.31–10.16)

## 2022-09-17 PROCEDURE — 86430 RHEUMATOID FACTOR TEST QUAL: CPT | Performed by: FAMILY MEDICINE

## 2022-09-17 PROCEDURE — 80061 LIPID PANEL: CPT

## 2022-09-17 PROCEDURE — 36415 COLL VENOUS BLD VENIPUNCTURE: CPT | Performed by: FAMILY MEDICINE

## 2022-09-17 PROCEDURE — 86231 EMA EACH IG CLASS: CPT | Performed by: FAMILY MEDICINE

## 2022-09-17 PROCEDURE — 86235 NUCLEAR ANTIGEN ANTIBODY: CPT | Performed by: FAMILY MEDICINE

## 2022-09-17 PROCEDURE — 86258 DGP ANTIBODY EACH IG CLASS: CPT | Performed by: FAMILY MEDICINE

## 2022-09-17 PROCEDURE — 86200 CCP ANTIBODY: CPT

## 2022-09-17 PROCEDURE — 86225 DNA ANTIBODY NATIVE: CPT | Performed by: FAMILY MEDICINE

## 2022-09-17 PROCEDURE — 80053 COMPREHEN METABOLIC PANEL: CPT | Performed by: FAMILY MEDICINE

## 2022-09-17 PROCEDURE — 82784 ASSAY IGA/IGD/IGG/IGM EACH: CPT | Performed by: FAMILY MEDICINE

## 2022-09-17 PROCEDURE — 86140 C-REACTIVE PROTEIN: CPT | Performed by: FAMILY MEDICINE

## 2022-09-17 PROCEDURE — 86364 TISS TRNSGLTMNASE EA IG CLAS: CPT | Performed by: FAMILY MEDICINE

## 2022-09-17 PROCEDURE — 85025 COMPLETE CBC W/AUTO DIFF WBC: CPT | Performed by: FAMILY MEDICINE

## 2022-09-18 LAB — RHEUMATOID FACT SER QL LA: NEGATIVE

## 2022-09-19 LAB
DSDNA AB SER-ACNC: <1 IU/ML (ref 0–9)
ENA SS-A AB SER-ACNC: <0.2 AI (ref 0–0.9)
ENA SS-B AB SER-ACNC: <0.2 AI (ref 0–0.9)

## 2022-09-20 LAB
CCP AB SER IA-ACNC: 0.8
ENDOMYSIUM IGA SER QL: NEGATIVE
GLIADIN PEPTIDE IGA SER-ACNC: 4 UNITS (ref 0–19)
GLIADIN PEPTIDE IGG SER-ACNC: 3 UNITS (ref 0–19)
IGA SERPL-MCNC: 105 MG/DL (ref 87–352)
TTG IGA SER-ACNC: <2 U/ML (ref 0–3)
TTG IGG SER-ACNC: <2 U/ML (ref 0–5)

## 2022-10-08 ENCOUNTER — IMMUNIZATIONS (OUTPATIENT)
Dept: FAMILY MEDICINE CLINIC | Facility: CLINIC | Age: 62
End: 2022-10-08
Payer: COMMERCIAL

## 2022-10-08 DIAGNOSIS — Z23 ENCOUNTER FOR IMMUNIZATION: Primary | ICD-10-CM

## 2022-10-08 PROCEDURE — 90682 RIV4 VACC RECOMBINANT DNA IM: CPT

## 2022-10-08 PROCEDURE — 90471 IMMUNIZATION ADMIN: CPT

## 2022-10-26 DIAGNOSIS — F41.9 ANXIETY: ICD-10-CM

## 2022-10-26 RX ORDER — ESCITALOPRAM OXALATE 10 MG/1
TABLET ORAL
Qty: 90 TABLET | Refills: 3 | Status: SHIPPED | OUTPATIENT
Start: 2022-10-26

## 2022-11-03 ENCOUNTER — HOSPITAL ENCOUNTER (OUTPATIENT)
Dept: RADIOLOGY | Age: 62
Discharge: HOME | End: 2022-11-03

## 2022-11-03 VITALS — WEIGHT: 198 LBS | HEIGHT: 64 IN | BODY MASS INDEX: 33.8 KG/M2

## 2022-11-03 DIAGNOSIS — Z12.31 VISIT FOR SCREENING MAMMOGRAM: ICD-10-CM

## 2022-12-20 ENCOUNTER — TELEMEDICINE (OUTPATIENT)
Dept: FAMILY MEDICINE CLINIC | Facility: CLINIC | Age: 62
End: 2022-12-20

## 2022-12-20 VITALS — TEMPERATURE: 97.6 F

## 2022-12-20 DIAGNOSIS — U07.1 COVID-19: Primary | ICD-10-CM

## 2022-12-20 RX ORDER — NIRMATRELVIR AND RITONAVIR 300-100 MG
3 KIT ORAL 2 TIMES DAILY
Qty: 30 TABLET | Refills: 0 | Status: SHIPPED | OUTPATIENT
Start: 2022-12-20 | End: 2022-12-25

## 2022-12-20 RX ORDER — LIFITEGRAST 50 MG/ML
1 SOLUTION/ DROPS OPHTHALMIC EVERY 12 HOURS
COMMUNITY
Start: 2022-09-12

## 2022-12-20 NOTE — LETTER
December 20, 2022     Patient: Mahendra Rodriguez  YOB: 1960  Date of Visit: 12/20/2022      To Whom it May Concern:    Mahendra Rodriguez is under my professional care  Sunday Pearson was seen in my office on 12/20/2022  Sunday Pearson may return to work on 12/27/22  If you have any questions or concerns, please don't hesitate to call           Sincerely,          GREGORY Hung        CC: No Recipients

## 2022-12-20 NOTE — ASSESSMENT & PLAN NOTE
Onset 12/15, tested positive for covid 12/19  Sx include body aches, congestion, fatigue, back pain, decreased appetite, loss of taste  Denies fever, cp, sob, cough, headache  Taking apap, meloxicam   Pt is eligible for tx with paxlovid and agrees to treatment  Reviewed how to take and SE  Can use otc medications as needed for sx relief  Reinforced hydration  Ok to return to work next week    Pt instructed to call for reevaluation if sx worsen or persist

## 2022-12-20 NOTE — PROGRESS NOTES
COVID-19 Outpatient Progress Note    Assessment/Plan:    Problem List Items Addressed This Visit        Other    [id number] - Primary     Onset 12/15, tested positive for covid 12/19  Sx include body aches, congestion, fatigue, back pain, decreased appetite, loss of taste  Denies fever, cp, sob, cough, headache  Taking apap, meloxicam   Pt is eligible for tx with paxlovid and agrees to treatment  Reviewed how to take and SE  Can use otc medications as needed for sx relief  Reinforced hydration  Ok to return to work next week  Pt instructed to call for reevaluation if sx worsen or persist            Relevant Medications    nirmatrelvir & ritonavir (Paxlovid, 300/100,) tablet therapy pack      Disposition:     Patient has asymptomatic or mild COVID-19 infection  Based off CDC guidelines, they were recommended to isolate for 5 days  If they are asymptomatic or symptoms are improving with no fevers in the past 24 hours, isolation may be ended followed by 5 days of wearing a mask when around othes to minimize risk of infecting others  If still have a fever or other symptoms have not improved, continue to isolate until they improve  Regardless of when they end isolation, avoid being around people who are more likely to get very sick from COVID-19 until at least day 11  Discussed symptom directed medication options with patient  Discussed vitamin D, vitamin C, and/or zinc supplementation with patient  Patient meets criteria for PAXLOVID and they have been counseled appropriately according to EUA documentation released by the FDA  After discussion, patient agrees to treatment  Jean-Claude Lull is an investigational medicine used to treat mild-to-moderate COVID-19 in adults and children (15years of age and older weighing at least 80 pounds (40 kg)) with positive results of direct SARS-CoV-2 viral testing, and who are at high risk for progression to severe COVID-19, including hospitalization or death   Jean-Claude Lull is investigational because it is still being studied  There is limited information about the safety and effectiveness of using PAXLOVID to treat people with mild-to-moderate COVID-19  The FDA has authorized the emergency use of PAXLOVID for the treatment of mild-tomoderate COVID-19 in adults and children (15years of age and older weighing at least 80 pounds (40 kg)) with a positive test for the virus that causes COVID-19, and who are at high risk for progression to severe COVID-19, including hospitalization or death, under an EUA  What should I tell my healthcare provider before I take PAXLOVID? Tell your healthcare provider if you:  - Have any allergies  - Have liver or kidney disease  - Are pregnant or plan to become pregnant  - Are breastfeeding a child  - Have any serious illnesses    Tell your healthcare provider about all the medicines you take, including prescription and over-the-counter medicines, vitamins, and herbal supplements  Some medicines may interact with PAXLOVID and may cause serious side effects  Keep a list of your medicines to show your healthcare provider and pharmacist when you get a new medicine  You can ask your healthcare provider or pharmacist for a list of medicines that interact with PAXLOVID  Do not start taking a new medicine without telling your healthcare provider  Your healthcare provider can tell you if it is safe to take PAXLOVID with other medicines  Tell your healthcare provider if you are taking combined hormonal contraceptive  PAXLOVID may affect how your birth control pills work  Females who are able to become pregnant should use another effective alternative form of contraception or an additional barrier method of contraception  Talk to your healthcare provider if you have any questions about contraceptive methods that might be right for you  How do I take PAXLOVID? PAXLOVID consists of 2 medicines: nirmatrelvir and ritonavir    - Take 2 pink tablets of nirmatrelvir with 1 white tablet of ritonavir by mouth 2 times each day (in the morning and in the evening) for 5 days  For each dose, take all 3 tablets at the same time  - If you have kidney disease, talk to your healthcare provider  You may need a different dose  - Swallow the tablets whole  Do not chew, break, or crush the tablets  - Take PAXLOVID with or without food  - Do not stop taking PAXLOVID without talking to your healthcare provider, even if you feel better  - If you miss a dose of PAXLOVID within 8 hours of the time it is usually taken, take it as soon as you remember  If you miss a dose by more than 8 hours, skip the missed dose and take the next dose at your regular time  Do not take 2 doses of PAXLOVID at the same time  - If you take too much PAXLOVID, call your healthcare provider or go to the nearest hospital emergency room right away  - If you are taking a ritonavir- or cobicistat-containing medicine to treat hepatitis C or Human Immunodeficiency Virus (HIV), you should continue to take your medicine as prescribed by your healthcare provider   - Talk to your healthcare provider if you do not feel better or if you feel worse after 5 days  Who should generally not take PAXLOVID? Do not take PAXLOVID if:  You are allergic to nirmatrelvir, ritonavir, or any of the ingredients in PAXLOVID  You are taking any of the following medicines:  - Alfuzosin  - Pethidine, piroxicam, propoxyphene  - Ranolazine  - Amiodarone, dronedarone, flecainide, propafenone, quinidine  - Colchicine  - Lurasidone, pimozide, clozapine  - Dihydroergotamine, ergotamine, methylergonovine  - Lovastatin, simvastatin  - Sildenafil (Revatio®) for pulmonary arterial hypertension (PAH)  - Triazolam, oral midazolam  - Apalutamide  - Carbamazepine, phenobarbital, phenytoin  - Rifampin  - St  Kleber’s Wort (hypericum perforatum)    What are the important possible side effects of PAXLOVID?     Possible side effects of PAXLOVID are:  - Liver Problems  Tell your healthcare provider right away if you have any of these signs and symptoms of liver problems: loss of appetite, yellowing of your skin and the whites of eyes (jaundice), dark-colored urine, pale colored stools and itchy skin, stomach area (abdominal) pain  - Resistance to HIV Medicines  If you have untreated HIV infection, PAXLOVID may lead to some HIV medicines not working as well in the future  - Other possible side effects include: altered sense of taste, diarrhea, high blood pressure, or muscle aches    These are not all the possible side effects of PAXLOVID  Not many people have taken PAXLOVID  Serious and unexpected side effects may happen  José Miguel Rivera is still being studied, so it is possible that all of the risks are not known at this time  What other treatment choices are there? Like [place]it may allow for the emergency use of other medicines to treat people with COVID-19  Go to [employer]  for information on the emergency use of other medicines that are authorized by FDA to treat people with COVID-19  Your healthcare provider may talk with you about clinical trials for which you may be eligible  It is your choice to be treated or not to be treated with PAXLOVID  Should you decide not to receive it or for your child not to receive it, it will not change your standard medical care  What if I am pregnant or breastfeeding? There is no experience treating pregnant women or breastfeeding mothers with PAXLOVID  For a mother and unborn baby, the benefit of taking PAXLOVID may be greater than the risk from the treatment  If you are pregnant, discuss your options and specific situation with your healthcare provider  It is recommended that you use effective barrier contraception or do not have sexual activity while taking PAXLOVID      If you are breastfeeding, discuss your options and specific situation with your healthcare provider  How do I report side effects with PAXLOVID? Contact your healthcare provider if you have any side effects that bother you or do not go away  Report side effects to FDA MedWatch at www fda gov/medwatch or call 9-830-EOH5132 or you can report side effects to [facility]  at the contact information provided below  Website Fax number Telephone number   [employer] [phone]     How should I store [employer]? Store PAXLOVID tablets at room temperature between 68°F to 77°F (20°C to 25°C)  Full fact sheet for patients, parents, and caregivers can be found at: [employer]    I have spent 15 minutes directly with the patient  Greater than 50% of this time was spent in counseling/coordination of care regarding: diagnostic results, prognosis, risks and benefits of treatment options, instructions for management, patient and family education, importance of treatment compliance, risk factor reductions and impressions  Encounter provider: GREGORY Avila     Provider located at: [address] [address]     Recent Visits  No visits were found meeting these conditions  Showing recent visits within past 7 days and meeting all other requirements  Today's Visits  Date Type Provider Dept   12/20/22 Telemedicine Bina Perez, [facility]   Showing today's visits and meeting all other requirements  Future Appointments  No visits were found meeting these conditions  Showing future appointments within next 150 days and meeting all other requirements     This virtual check-in was done via [employer] [address] and patient was informed that this is a secure, HIPAA-compliant platform  She agrees to proceed      Patient agrees to participate in a virtual check in via telephone or video visit instead of presenting to the office to address urgent/immediate medical needs  Patient is aware this is a billable service  She acknowledged consent and understanding of privacy and security of the video platform  The patient has agreed to participate and understands they can discontinue the visit at any time  After connecting through [facility], the patient was identified by name and date of birth  Aries Boykin was informed that this was a telemedicine visit and that the exam was being conducted confidentially over secure lines  My office door was closed  No one else was in the room  Aries Nathaly Boykin acknowledged consent and understanding of privacy and security of the telemedicine visit  I informed the patient that I have reviewed her record in Epic and presented the opportunity for her to ask any questions regarding the visit today  The patient agreed to participate  Verification of patient location:  Patient is located in the following state in which I hold an active license: [place]    Subjective:   Imtiaz Mohr is a 63 y o  female who has been screened for COVID-19  Patient's symptoms include fatigue, nasal congestion, loss of taste and myalgias  Patient denies fever, chills, malaise, rhinorrhea, sore throat, anosmia, cough, shortness of breath, chest tightness, abdominal pain, nausea, vomiting, diarrhea and headaches  - Date of symptom onset: 12/15/2022      COVID-19 vaccination status: Fully vaccinated with booster    Kiesha has been staying home and has isolated themselves in her home  She is taking care to not share personal items and     Lab Results   Component Value Date    [facility] [address] Not Detected 12/24/2021       Review of Systems   Constitutional: Positive for appetite change and fatigue  Negative for chills and fever  HENT: Positive for congestion  Negative for rhinorrhea and sore throat  Respiratory: Negative for cough, chest tightness and shortness of breath  Gastrointestinal: Negative for abdominal pain, diarrhea, nausea and vomiting  Musculoskeletal: Positive for myalgias  Neurological: Negative for headaches  Current Outpatient Medications on File Prior to Visit   Medication Sig   • Calcium Carb-Cholecalciferol 600-800 MG-UNIT TABS Take by mouth   • cetirizine (ZyrTEC) 10 mg tablet Take 1 tablet by mouth as needed    • cholecalciferol (VITAMIN D3) 1,000 units tablet Take 1 tablet by mouth daily   • escitalopram (LEXAPRO) 10 mg tablet TAKE 1 TABLET BY MOUTH EVERY DAY   • fluticasone (FLONASE) 50 mcg/act nasal spray SPRAY 2 SPRAYS INTO EACH NOSTRIL EVERY DAY (Patient taking differently: into each nostril if needed)   • meloxicam (MOBIC) 15 mg tablet TAKE 1 TABLET (15 MG TOTAL) BY MOUTH DAILY TAKE WITH FOOD OR MILK  (Patient taking differently: Take 15 mg by mouth if needed Take with food or milk )   • Multiple Vitamins-Minerals (WOMENS MULTIVITAMIN PO) Take by mouth daily   • Xiidra 5 % op solution Administer 1 drop to both eyes every 12 (twelve) hours       Objective:    Temp 97 6 °F (36 4 °C)      Physical Exam  Vitals reviewed  Constitutional:       General: She is awake  She is not in acute distress  Appearance: Normal appearance  She is well-developed and well-groomed  She is not ill-appearing  Eyes:      General: Lids are normal       Conjunctiva/sclera: Conjunctivae normal    Pulmonary:      Effort: Pulmonary effort is normal  No tachypnea, accessory muscle usage or respiratory distress  Neurological:      Mental Status: She is alert and oriented to person, place, and time  Psychiatric:         Attention and Perception: Attention normal          Mood and Affect: Mood normal          Speech: Speech normal          Behavior: Behavior normal  Behavior is cooperative  Thought Content:  Thought content normal          Judgment: Judgment normal        GREGORY Jeffery

## 2023-02-01 ENCOUNTER — HOSPITAL ENCOUNTER (OUTPATIENT)
Dept: BONE DENSITY | Facility: CLINIC | Age: 63
Discharge: HOME | End: 2023-02-01

## 2023-02-01 DIAGNOSIS — Z13.820 OSTEOPOROSIS SCREENING: ICD-10-CM

## 2023-09-12 ENCOUNTER — OFFICE VISIT (OUTPATIENT)
Dept: FAMILY MEDICINE CLINIC | Facility: CLINIC | Age: 63
End: 2023-09-12
Payer: COMMERCIAL

## 2023-09-12 VITALS
BODY MASS INDEX: 33.46 KG/M2 | TEMPERATURE: 97.8 F | OXYGEN SATURATION: 95 % | HEIGHT: 64 IN | SYSTOLIC BLOOD PRESSURE: 130 MMHG | DIASTOLIC BLOOD PRESSURE: 86 MMHG | HEART RATE: 78 BPM | WEIGHT: 196 LBS | RESPIRATION RATE: 17 BRPM

## 2023-09-12 DIAGNOSIS — Z00.00 ANNUAL PHYSICAL EXAM: Primary | ICD-10-CM

## 2023-09-12 DIAGNOSIS — F32.0 MAJOR DEPRESSIVE DISORDER, SINGLE EPISODE, MILD (HCC): ICD-10-CM

## 2023-09-12 DIAGNOSIS — M35.9 CONNECTIVE TISSUE DISEASE (HCC): ICD-10-CM

## 2023-09-12 DIAGNOSIS — M25.562 ACUTE PAIN OF LEFT KNEE: ICD-10-CM

## 2023-09-12 DIAGNOSIS — Z12.31 VISIT FOR SCREENING MAMMOGRAM: ICD-10-CM

## 2023-09-12 PROBLEM — U07.1 COVID-19: Status: RESOLVED | Noted: 2022-12-20 | Resolved: 2023-09-12

## 2023-09-12 PROBLEM — R11.2 PONV (POSTOPERATIVE NAUSEA AND VOMITING): Status: RESOLVED | Noted: 2020-08-20 | Resolved: 2023-09-12

## 2023-09-12 PROBLEM — Z98.890 PONV (POSTOPERATIVE NAUSEA AND VOMITING): Status: RESOLVED | Noted: 2020-08-20 | Resolved: 2023-09-12

## 2023-09-12 PROCEDURE — 99396 PREV VISIT EST AGE 40-64: CPT | Performed by: FAMILY MEDICINE

## 2023-09-12 RX ORDER — METHYLPREDNISOLONE 4 MG/1
TABLET ORAL
Qty: 21 EACH | Refills: 0 | Status: SHIPPED | OUTPATIENT
Start: 2023-09-12

## 2023-09-12 NOTE — PROGRESS NOTES
[facility]    NAME: Kiesha Boykin  AGE: 63 y.o. SEX: female  : 1960     DATE: 2023     Assessment and Plan:     Problem List Items Addressed This Visit        Other    Connective tissue disease ([address])    Relevant Orders    C-reactive protein    Major depressive disorder, single episode, mild ([address])     Doing well on lexapro        Other Visit Diagnoses     Annual physical exam    -  Primary    Relevant Orders    Ambulatory Referral to Obstetrics / Gynecology    Comprehensive metabolic panel    CBC and differential    Lipid panel    Acute pain of left knee        Relevant Medications    methylPREDNISolone 4 MG tablet therapy pack    Other Relevant Orders    XR knee 3 vw left non injury    Visit for screening mammogram        Relevant Orders    Mammo screening bilateral w 3d & cad          Immunizations and preventive care screenings were discussed with patient today. Appropriate education was printed on patient's after visit summary. Counseling:  Alcohol/drug use: discussed moderation in alcohol intake, the recommendations for healthy alcohol use, and avoidance of illicit drug use. Dental Health: discussed importance of regular tooth brushing, flossing, and dental visits. Injury prevention: discussed safety/seat belts, safety helmets, smoke detectors, carbon dioxide detectors, and smoking near bedding or upholstery. Sexual health: discussed sexually transmitted diseases, partner selection, use of condoms, avoidance of unintended pregnancy, and contraceptive alternatives. Exercise: the importance of regular exercise/physical activity was discussed. Recommend exercise 3-5 times per week for at least 30 minutes. BMI Counseling: Body mass index is 33.67 kg/m². The BMI is above normal. Nutrition recommendations include decreasing portion sizes and encouraging healthy choices of fruits and vegetables.  Exercise recommendations include moderate physical activity 150 minutes/week. No pharmacotherapy was ordered. Rationale for BMI follow-up plan is due to patient being overweight or obese. No follow-ups on file. Chief Complaint:     Chief Complaint   Patient presents with   • Physical Exam     Patient being seen for Physical Exam      History of Present Illness:     Adult Annual Physical   Patient here for a comprehensive physical exam. The patient reports has left knee pain when she bends down and kneeling down for the last 1-2 months . Aby Cortés Diet and Physical Activity  Diet/Nutrition: well balanced diet and consuming 3-5 servings of fruits/vegetables daily. Exercise: walking. Depression Screening  PHQ-2/9 Depression Screening    Little interest or pleasure in doing things: 0 - not at all  Feeling down, depressed, or hopeless: 0 - not at all  Trouble falling or staying asleep, or sleeping too much: 0 - not at all  Feeling tired or having little energy: 0 - not at all  Poor appetite or overeatin - not at all  Feeling bad about yourself - or that you are a failure or have let yourself or your family down: 0 - not at all  Trouble concentrating on things, such as reading the newspaper or watching television: 0 - not at all  Moving or speaking so slowly that other people could have noticed. Or the opposite - being so fidgety or restless that you have been moving around a lot more than usual: 0 - not at all  Thoughts that you would be better off dead, or of hurting yourself in some way: 0 - not at all  PHQ-9 Score: 0   PHQ-9 Interpretation: No or Minimal depression        General Health  Sleep: sleeps well and gets 7-8 hours of sleep on average. Hearing: normal - bilateral.  Vision: goes for regular eye exams and wears glasses. Dental: regular dental visits and brushes teeth twice daily.        /GYN Health  Patient is: postmenopausal  Last menstrual period: about 5 years ago  Contraceptive method: none.     Review of Systems:     Review of Systems   Constitutional: Negative. HENT: Negative. Eyes: Negative. Respiratory: Negative. Cardiovascular: Negative. Gastrointestinal: Negative. Endocrine: Negative. Genitourinary: Negative. Musculoskeletal: Positive for arthralgias. Skin: Negative. Allergic/Immunologic: Negative. Neurological: Negative. Hematological: Negative. Psychiatric/Behavioral: Negative. Past Medical History:     Past Medical History:   Diagnosis Date   • Anxiety    • Arthritis    • Colon polyp    • COVID-19 12/20/2022   • Neoplasm of ovary     Bilateral   • PONV (postoperative nausea and vomiting)       Past Surgical History:     Past Surgical History:   Procedure Laterality Date   • CHOLECYSTECTOMY     • COLONOSCOPY     • AL NDSC WRST SURG W/RLS TRANSVRS CARPL LIGM Right 6/2/2017    Procedure: ENDOSCOPIC CARPAL TUNNEL RELEASE ;  Surgeon: Meghana Casillas MD;  Location: [facility];  Service: Orthopedics   • UTERINE FIBROID SURGERY        Social History:     Social History     Socioeconomic History   • Marital status: /Civil Union     Spouse name: None   • Number of children: None   • Years of education: None   • Highest education level: None   Occupational History   • Occupation:    Tobacco Use   • Smoking status: Never   • Smokeless tobacco: Never   Vaping Use   • Vaping Use: Never used   Substance and Sexual Activity   • Alcohol use: Yes     Comment:   Alcohol use (1/mo)   • Drug use: No   • Sexual activity: Never   Other Topics Concern   • None   Social History Narrative    Exercise habits:  1x week for 45 min    Living situations:   With , son, daughter, dog     Social Determinants of Health     Financial Resource Strain: Not on file   Food Insecurity: Not on file   Transportation Needs: Not on file   Physical Activity: Not on file   Stress: Not on file   Social Connections: Not on file   Intimate Partner Violence: Not on file   Housing Stability: Not on file      Family History:     Family History   Problem Relation Age of Onset   • Hypothyroidism Mother    • Dementia Mother    • Aortic aneurysm Mother         Abdominal   • Arthritis Mother    • Diverticulitis Mother    • Pancreatitis Mother    • Other Mother         Tachycardia   • No Known Problems Father    • Breast cancer Sister 64   • Other Sister         Neoplasm of the brain, Nontropical (celiac) sprue   • Diabetes Paternal Grandmother    • Colon cancer Maternal Aunt       Current Medications:     Current Outpatient Medications   Medication Sig Dispense Refill   • Calcium Carb-Cholecalciferol 600-800 MG-UNIT TABS Take by mouth     • cetirizine (ZyrTEC) 10 mg tablet Take 1 tablet by mouth as needed      • cholecalciferol (VITAMIN D3) 1,000 units tablet Take 1 tablet by mouth daily     • escitalopram (LEXAPRO) 10 mg tablet TAKE 1 TABLET BY MOUTH EVERY DAY 90 tablet 3   • fluticasone (FLONASE) 50 mcg/act nasal spray SPRAY 2 SPRAYS INTO EACH NOSTRIL EVERY DAY (Patient taking differently: into each nostril if needed) 16 mL 3   • meloxicam (MOBIC) 15 mg tablet TAKE 1 TABLET (15 MG TOTAL) BY MOUTH DAILY TAKE WITH FOOD OR MILK. (Patient taking differently: Take 15 mg by mouth if needed Take with food or milk.) 90 tablet 0   • methylPREDNISolone 4 MG tablet therapy pack Use as directed on package 21 each 0   • Multiple Vitamins-Minerals (WOMENS MULTIVITAMIN PO) Take by mouth daily     • Xiidra 5 % op solution Administer 1 drop to both eyes every 12 (twelve) hours       No current facility-administered medications for this visit. Allergies:      Allergies   Allergen Reactions   • Penicillins Other (See Comments), Itching and Rash     Serum sickness  Pt developed "crystals", and itchy rash   • Amoxicillin Rash and Syncope      Physical Exam:     /86 (BP Location: Left arm, Patient Position: Sitting, Cuff Size: Standard)   Pulse 78   Temp 97.8 °F (36.6 °C) (Tympanic) Resp 17   Ht 5' 3.98" (1.625 m)   Wt 88.9 kg (196 lb)   SpO2 95%   BMI 33.67 kg/m²     Physical Exam  Vitals and nursing note reviewed. Constitutional:       Appearance: Normal appearance. She is well-developed. HENT:      Head: Normocephalic and atraumatic. Right Ear: Tympanic membrane normal.      Left Ear: Tympanic membrane normal.      Nose: Nose normal.      Mouth/Throat:      Mouth: Mucous membranes are moist.   Eyes:      Pupils: Pupils are equal, round, and reactive to light. Cardiovascular:      Rate and Rhythm: Normal rate and regular rhythm. Pulses: Normal pulses. Heart sounds: Normal heart sounds. Pulmonary:      Effort: Pulmonary effort is normal. No respiratory distress. Breath sounds: Normal breath sounds. No wheezing. Abdominal:      General: Bowel sounds are normal.      Palpations: Abdomen is soft. Musculoskeletal:         General: No deformity. Normal range of motion. Cervical back: Normal range of motion and neck supple. Skin:     General: Skin is warm. Neurological:      General: No focal deficit present. Mental Status: She is alert and oriented to person, place, and time.    Psychiatric:         Mood and Affect: Mood normal.         Behavior: Behavior normal.          Soumya Hahn MD  [address] [place] [address]

## 2023-09-15 ENCOUNTER — HOSPITAL ENCOUNTER (OUTPATIENT)
Dept: RADIOLOGY | Facility: HOSPITAL | Age: 63
Discharge: HOME | End: 2023-09-15
Payer: COMMERCIAL

## 2023-09-15 DIAGNOSIS — M25.562 ACUTE PAIN OF LEFT KNEE: ICD-10-CM

## 2023-09-15 PROCEDURE — 73562 X-RAY EXAM OF KNEE 3: CPT

## 2023-10-14 ENCOUNTER — IMMUNIZATIONS (OUTPATIENT)
Dept: FAMILY MEDICINE CLINIC | Facility: CLINIC | Age: 63
End: 2023-10-14
Payer: COMMERCIAL

## 2023-10-14 DIAGNOSIS — Z23 ENCOUNTER FOR IMMUNIZATION: Primary | ICD-10-CM

## 2023-10-14 PROCEDURE — 90686 IIV4 VACC NO PRSV 0.5 ML IM: CPT

## 2023-10-14 PROCEDURE — 90471 IMMUNIZATION ADMIN: CPT

## 2023-10-22 ENCOUNTER — APPOINTMENT (OUTPATIENT)
Dept: LAB | Facility: CLINIC | Age: 63
End: 2023-10-22
Payer: COMMERCIAL

## 2023-10-22 DIAGNOSIS — M35.9 CONNECTIVE TISSUE DISEASE (HCC): ICD-10-CM

## 2023-10-22 LAB
ALBUMIN SERPL BCP-MCNC: 4.3 G/DL (ref 3.5–5)
ALP SERPL-CCNC: 53 U/L (ref 34–104)
ALT SERPL W P-5'-P-CCNC: 20 U/L (ref 7–52)
ANION GAP SERPL CALCULATED.3IONS-SCNC: 5 MMOL/L
AST SERPL W P-5'-P-CCNC: 19 U/L (ref 13–39)
BASOPHILS # BLD AUTO: 0.03 THOUSANDS/ÂΜL (ref 0–0.1)
BASOPHILS NFR BLD AUTO: 1 % (ref 0–1)
BILIRUB SERPL-MCNC: 0.52 MG/DL (ref 0.2–1)
BUN SERPL-MCNC: 13 MG/DL (ref 5–25)
CALCIUM SERPL-MCNC: 9.5 MG/DL (ref 8.4–10.2)
CHLORIDE SERPL-SCNC: 107 MMOL/L (ref 96–108)
CHOLEST SERPL-MCNC: 241 MG/DL
CO2 SERPL-SCNC: 26 MMOL/L (ref 21–32)
CREAT SERPL-MCNC: 0.68 MG/DL (ref 0.6–1.3)
CRP SERPL QL: 4.1 MG/L
EOSINOPHIL # BLD AUTO: 0.16 THOUSAND/ÂΜL (ref 0–0.61)
EOSINOPHIL NFR BLD AUTO: 5 % (ref 0–6)
ERYTHROCYTE [DISTWIDTH] IN BLOOD BY AUTOMATED COUNT: 13.2 % (ref 11.6–15.1)
GFR SERPL CREATININE-BSD FRML MDRD: 94 ML/MIN/1.73SQ M
GLUCOSE P FAST SERPL-MCNC: 93 MG/DL (ref 65–99)
HCT VFR BLD AUTO: 43.8 % (ref 34.8–46.1)
HDLC SERPL-MCNC: 68 MG/DL
HGB BLD-MCNC: 14.4 G/DL (ref 11.5–15.4)
IMM GRANULOCYTES # BLD AUTO: 0.01 THOUSAND/UL (ref 0–0.2)
IMM GRANULOCYTES NFR BLD AUTO: 0 % (ref 0–2)
LDLC SERPL CALC-MCNC: 150 MG/DL (ref 0–100)
LYMPHOCYTES # BLD AUTO: 1.02 THOUSANDS/ÂΜL (ref 0.6–4.47)
LYMPHOCYTES NFR BLD AUTO: 30 % (ref 14–44)
MCH RBC QN AUTO: 30.6 PG (ref 26.8–34.3)
MCHC RBC AUTO-ENTMCNC: 32.9 G/DL (ref 31.4–37.4)
MCV RBC AUTO: 93 FL (ref 82–98)
MONOCYTES # BLD AUTO: 0.29 THOUSAND/ÂΜL (ref 0.17–1.22)
MONOCYTES NFR BLD AUTO: 9 % (ref 4–12)
NEUTROPHILS # BLD AUTO: 1.88 THOUSANDS/ÂΜL (ref 1.85–7.62)
NEUTS SEG NFR BLD AUTO: 55 % (ref 43–75)
NONHDLC SERPL-MCNC: 173 MG/DL
NRBC BLD AUTO-RTO: 0 /100 WBCS
PLATELET # BLD AUTO: 252 THOUSANDS/UL (ref 149–390)
PMV BLD AUTO: 10.4 FL (ref 8.9–12.7)
POTASSIUM SERPL-SCNC: 3.9 MMOL/L (ref 3.5–5.3)
PROT SERPL-MCNC: 7.4 G/DL (ref 6.4–8.4)
RBC # BLD AUTO: 4.71 MILLION/UL (ref 3.81–5.12)
SODIUM SERPL-SCNC: 138 MMOL/L (ref 135–147)
TRIGL SERPL-MCNC: 116 MG/DL
WBC # BLD AUTO: 3.39 THOUSAND/UL (ref 4.31–10.16)

## 2023-10-22 PROCEDURE — 36415 COLL VENOUS BLD VENIPUNCTURE: CPT

## 2023-10-22 PROCEDURE — 86140 C-REACTIVE PROTEIN: CPT

## 2023-10-24 DIAGNOSIS — F41.9 ANXIETY: ICD-10-CM

## 2023-10-24 RX ORDER — ESCITALOPRAM OXALATE 10 MG/1
TABLET ORAL
Qty: 90 TABLET | Refills: 1 | Status: SHIPPED | OUTPATIENT
Start: 2023-10-24

## 2023-11-20 ENCOUNTER — HOSPITAL ENCOUNTER (OUTPATIENT)
Dept: RADIOLOGY | Age: 63
Discharge: HOME | End: 2023-11-20
Payer: COMMERCIAL

## 2023-11-20 VITALS — HEIGHT: 64 IN | WEIGHT: 196 LBS | BODY MASS INDEX: 33.46 KG/M2

## 2023-11-20 DIAGNOSIS — Z12.31 VISIT FOR SCREENING MAMMOGRAM: ICD-10-CM

## 2023-11-20 PROCEDURE — 77067 SCR MAMMO BI INCL CAD: CPT

## 2023-11-20 PROCEDURE — 77063 BREAST TOMOSYNTHESIS BI: CPT

## 2023-12-08 ENCOUNTER — VBI (OUTPATIENT)
Dept: ADMINISTRATIVE | Facility: OTHER | Age: 63
End: 2023-12-08

## 2023-12-08 NOTE — TELEPHONE ENCOUNTER
12/08/23 12:20 PM     VB [employer] SmartForm used to document caregap status.     Marcellus Nicolas MA

## 2023-12-13 ENCOUNTER — HOSPITAL ENCOUNTER (OUTPATIENT)
Dept: RADIOLOGY | Facility: HOSPITAL | Age: 63
Discharge: HOME | End: 2023-12-13
Payer: COMMERCIAL

## 2023-12-13 ENCOUNTER — OFFICE VISIT (OUTPATIENT)
Dept: FAMILY MEDICINE CLINIC | Facility: CLINIC | Age: 63
End: 2023-12-13
Payer: COMMERCIAL

## 2023-12-13 VITALS
HEIGHT: 65 IN | DIASTOLIC BLOOD PRESSURE: 80 MMHG | SYSTOLIC BLOOD PRESSURE: 120 MMHG | WEIGHT: 194.2 LBS | BODY MASS INDEX: 32.36 KG/M2 | OXYGEN SATURATION: 98 % | RESPIRATION RATE: 18 BRPM | HEART RATE: 82 BPM | TEMPERATURE: 97.5 F

## 2023-12-13 DIAGNOSIS — E55.9 VITAMIN D DEFICIENCY: ICD-10-CM

## 2023-12-13 DIAGNOSIS — G56.02 CARPAL TUNNEL SYNDROME OF LEFT WRIST: ICD-10-CM

## 2023-12-13 DIAGNOSIS — F32.0 MAJOR DEPRESSIVE DISORDER, SINGLE EPISODE, MILD (HCC): ICD-10-CM

## 2023-12-13 DIAGNOSIS — M35.9 CONNECTIVE TISSUE DISEASE (HCC): ICD-10-CM

## 2023-12-13 DIAGNOSIS — S69.91XA INJURY OF FINGER OF RIGHT HAND, INITIAL ENCOUNTER: Primary | ICD-10-CM

## 2023-12-13 DIAGNOSIS — S69.91XA INJURY OF FINGER OF RIGHT HAND, INITIAL ENCOUNTER: ICD-10-CM

## 2023-12-13 PROCEDURE — 99214 OFFICE O/P EST MOD 30 MIN: CPT | Performed by: FAMILY MEDICINE

## 2023-12-13 PROCEDURE — 73140 X-RAY EXAM OF FINGER(S): CPT

## 2023-12-13 RX ORDER — NAPROXEN 500 MG/1
500 TABLET ORAL 2 TIMES DAILY WITH MEALS
Qty: 20 TABLET | Refills: 0 | Status: SHIPPED | OUTPATIENT
Start: 2023-12-13 | End: 2023-12-23

## 2023-12-13 NOTE — PROGRESS NOTES
Name: Shakeel Márquez      : 1960      MRN: [id number]  Encounter Provider: Parker Diaz MD  Encounter Date: 2023   Encounter department: [facility]     1. Injury of finger of right hand, initial encounter  -     XR finger right second digit-index; Future; Expected date: 2023  -     naproxen (Naprosyn) 500 mg tablet; Take 1 tablet (500 mg total) by mouth 2 (two) times a day with meals for 10 days    2. Major depressive disorder, single episode, mild (HCC)  Assessment & Plan:  Doing well on lexapro      3. Vitamin D deficiency  Assessment & Plan:  Take vitamin D supplements daily      4. Connective tissue disease ([address])  Assessment & Plan:  Stable  Meloxicam as needed      5. Carpal tunnel syndrome of left wrist  Assessment & Plan:  No surgery needed at this time             Subjective     Poked herself with thorn to the right 2nd finger 1 month ago   Pulled out the thorn that time but not sure she got it all  Pain on and off since then   No fever/chills      Hand Pain   The incident occurred more than 1 week ago. Pain location: right 2nd finger. The pain does not radiate. Pain scale: pressing and bumping into it hurts more. The pain is mild. The pain has been Constant since the incident. Pertinent negatives include no chest pain, muscle weakness, numbness or tingling. The symptoms are aggravated by palpation and movement. Review of Systems   Cardiovascular:  Negative for chest pain. Gastrointestinal: Negative. Musculoskeletal:  Positive for arthralgias and joint swelling. Neurological:  Negative for tingling and numbness.        Past Medical History:   Diagnosis Date   • Allergic    • Anxiety    • Arthritis    • Colon polyp    • COVID-19 2022   • Neoplasm of ovary     Bilateral   • PONV (postoperative nausea and vomiting)      Past Surgical History:   Procedure Laterality Date   • CHOLECYSTECTOMY     • COLONOSCOPY     • [place] [facility] WRST SURG W/RLS TRANSVRS CARPL LIGM Right 6/2/2017    Procedure: ENDOSCOPIC CARPAL TUNNEL RELEASE ;  Surgeon: Sj Pérez MD;  Location: [facility];  Service: Orthopedics   • UTERINE FIBROID SURGERY       Family History   Problem Relation Age of Onset   • Hypothyroidism Mother    • Dementia Mother    • Aortic aneurysm Mother         Abdominal   • Arthritis Mother    • Diverticulitis Mother    • Pancreatitis Mother    • Other Mother         Tachycardia   • Alcohol abuse Father    • Breast cancer Sister 64   • Celiac disease Sister         sprue   • Other Sister         brain tumor   • Crohn's disease Daughter    • Cancer Maternal Grandmother         unsure of type   • No Known Problems Maternal Grandfather    • Diabetes Paternal Grandmother    • No Known Problems Maternal Aunt    • Colon cancer Maternal Aunt    • No Known Problems Maternal Aunt    • No Known Problems Son    • Autoimmune disease Daughter      Social History     Socioeconomic History   • Marital status: /Civil Union     Spouse name: None   • Number of children: None   • Years of education: None   • Highest education level: None   Occupational History   • Occupation:    Tobacco Use   • Smoking status: Never   • Smokeless tobacco: Never   Vaping Use   • Vaping status: Never Used   Substance and Sexual Activity   • Alcohol use: Not Currently     Comment:   Alcohol use (1/mo)   • Drug use: No   • Sexual activity: Not Currently     Partners: Male   Other Topics Concern   • None   Social History Narrative    Exercise habits:  1x week for 45 min    Living situations:   With , son, daughter, dog     Social Determinants of Health     Financial Resource Strain: Not on file   Food Insecurity: Not on file   Transportation Needs: Not on file   Physical Activity: Not on file   Stress: Not on file   Social Connections: Not on file   Intimate Partner Violence: Not on file   Housing Stability: Not on file     Current Outpatient Medications on File Prior to Visit   Medication Sig   • Calcium Carb-Cholecalciferol 600-800 MG-UNIT TABS Take by mouth   • cetirizine (ZyrTEC) 10 mg tablet Take 1 tablet by mouth as needed    • cholecalciferol (VITAMIN D3) 1,000 units tablet Take 1 tablet by mouth daily   • escitalopram (LEXAPRO) 10 mg tablet TAKE 1 TABLET BY MOUTH EVERY DAY   • fluticasone (FLONASE) 50 mcg/act nasal spray SPRAY 2 SPRAYS INTO EACH NOSTRIL EVERY DAY (Patient taking differently: into each nostril if needed)   • meloxicam (MOBIC) 15 mg tablet TAKE 1 TABLET (15 MG TOTAL) BY MOUTH DAILY TAKE WITH FOOD OR MILK.  (Patient taking differently: Take 15 mg by mouth if needed Take with food or milk.)   • Multiple Vitamins-Minerals (WOMENS MULTIVITAMIN PO) Take by mouth daily   • Xiidra 5 % op solution Administer 1 drop to both eyes every 12 (twelve) hours   • [DISCONTINUED] methylPREDNISolone 4 MG tablet therapy pack Use as directed on package     Allergies   Allergen Reactions   • Penicillins Other (See Comments), Itching and Rash     Serum sickness  Pt developed "crystals", and itchy rash   • Amoxicillin Rash and Syncope     Immunization History   Administered Date(s) Administered   • COVID-19 PFIZER VACCINE 0.3 ML IM 01/12/2021, 02/06/2021, 12/07/2021   • INFLUENZA 10/16/2013, 12/10/2015, 11/12/2016, 10/24/2018, 09/04/2019, 10/15/2021, 10/08/2022   • Influenza Injectable, MDCK, Preservative Free, Quadrivalent, 0.5 mL 09/04/2019   • Influenza Quadrivalent Preservative Free 3 years and older IM 09/29/2014, 10/01/2016   • Influenza, injectable, quadrivalent, preservative free 0.5 mL 10/24/2018, 10/14/2023   • Influenza, recombinant, quadrivalent,injectable, preservative free 08/31/2020, 10/08/2022   • Influenza, seasonal, injectable 10/12/2012, 10/18/2015   • Tdap 07/12/2019   • Zoster Vaccine Recombinant 09/01/2021, 03/02/2022       Objective     /80 (BP Location: Left arm, Patient Position: Sitting, Cuff Size: Standard)   Pulse 82 Temp 97.5 °F (36.4 °C) (Tympanic)   Resp 18   Ht 5' 4.78" (1.645 m)   Wt 88.1 kg (194 lb 3.2 oz)   SpO2 98%   BMI 32.54 kg/m²     Physical Exam  Constitutional:       Appearance: Normal appearance. Cardiovascular:      Rate and Rhythm: Normal rate and regular rhythm. Pulses: Normal pulses. Heart sounds: Normal heart sounds. Skin:     Findings: No erythema. Comments: Tenderness on palpation right 2nd finger   Neurological:      General: No focal deficit present. Mental Status: She is alert and oriented to person, place, and time.    Psychiatric:         Mood and Affect: Mood normal.         Behavior: Behavior normal.       Radha Benavidez MD

## 2024-03-26 DIAGNOSIS — Z00.6 ENCOUNTER FOR EXAMINATION FOR NORMAL COMPARISON OR CONTROL IN CLINICAL RESEARCH PROGRAM: ICD-10-CM

## 2024-03-28 ENCOUNTER — APPOINTMENT (OUTPATIENT)
Dept: LAB | Facility: CLINIC | Age: 64
End: 2024-03-28

## 2024-03-28 DIAGNOSIS — Z00.6 ENCOUNTER FOR EXAMINATION FOR NORMAL COMPARISON OR CONTROL IN CLINICAL RESEARCH PROGRAM: ICD-10-CM

## 2024-03-28 PROCEDURE — 36415 COLL VENOUS BLD VENIPUNCTURE: CPT

## 2024-04-18 LAB
APOB+LDLR+PCSK9 GENE MUT ANL BLD/T: NOT DETECTED
BRCA1+BRCA2 DEL+DUP + FULL MUT ANL BLD/T: NOT DETECTED
MLH1+MSH2+MSH6+PMS2 GN DEL+DUP+FUL M: NOT DETECTED

## 2024-04-24 DIAGNOSIS — F41.9 ANXIETY: ICD-10-CM

## 2024-04-24 RX ORDER — ESCITALOPRAM OXALATE 10 MG/1
TABLET ORAL
Qty: 90 TABLET | Refills: 1 | Status: SHIPPED | OUTPATIENT
Start: 2024-04-24

## 2024-08-07 ENCOUNTER — VBI (OUTPATIENT)
Dept: ADMINISTRATIVE | Facility: OTHER | Age: 64
End: 2024-08-07

## 2024-08-07 NOTE — TELEPHONE ENCOUNTER
08/07/24 1:08 PM     Chart reviewed for Pap Smear (HPV) aka Cervical Cancer Screening was/were not submitted to the patient's insurance.     Corrine Long MA   [facility]

## 2024-09-10 ENCOUNTER — TELEPHONE (OUTPATIENT)
Age: 64
End: 2024-09-10

## 2024-10-03 ENCOUNTER — TELEPHONE (OUTPATIENT)
Age: 64
End: 2024-10-03

## 2024-10-03 NOTE — TELEPHONE ENCOUNTER
Pt calling to schedule NP appt for a possible lipoma near her eye    Advised we are currently booking NP appts about a year out for derm, however general surgery may be an option for her. Also recommended maybe a plastic surgeon due to location of lump. I did advise pt I schedule for plastics as well, and they are currently NOT seeing those, so she would have to go out of [facility] network.    Pt stated she has an upcoming appt with PCP, she will show Dr Betancourt and see what she recommends.    I also told pt she is welcome to call periodically for cancellations, or to possible schedule with Dr FOSS once his schedule opens up. Pt agreed

## 2024-10-05 ENCOUNTER — IMMUNIZATIONS (OUTPATIENT)
Dept: FAMILY MEDICINE CLINIC | Facility: CLINIC | Age: 64
End: 2024-10-05
Payer: COMMERCIAL

## 2024-10-05 DIAGNOSIS — Z23 ENCOUNTER FOR IMMUNIZATION: Primary | ICD-10-CM

## 2024-10-05 PROCEDURE — 90673 RIV3 VACCINE NO PRESERV IM: CPT

## 2024-10-05 PROCEDURE — G0008 ADMIN INFLUENZA VIRUS VAC: HCPCS

## 2024-10-19 DIAGNOSIS — F41.9 ANXIETY: ICD-10-CM

## 2024-10-21 RX ORDER — ESCITALOPRAM OXALATE 10 MG/1
TABLET ORAL
Qty: 90 TABLET | Refills: 1 | Status: SHIPPED | OUTPATIENT
Start: 2024-10-21

## 2024-11-08 ENCOUNTER — OFFICE VISIT (OUTPATIENT)
Dept: OBGYN CLINIC | Facility: CLINIC | Age: 64
End: 2024-11-08
Payer: COMMERCIAL

## 2024-11-08 VITALS
DIASTOLIC BLOOD PRESSURE: 88 MMHG | SYSTOLIC BLOOD PRESSURE: 136 MMHG | WEIGHT: 191 LBS | HEIGHT: 65 IN | BODY MASS INDEX: 31.82 KG/M2

## 2024-11-08 DIAGNOSIS — Z12.11 SCREEN FOR COLON CANCER: ICD-10-CM

## 2024-11-08 DIAGNOSIS — Z12.4 ENCOUNTER FOR SCREENING FOR MALIGNANT NEOPLASM OF CERVIX: ICD-10-CM

## 2024-11-08 DIAGNOSIS — Z12.31 ENCOUNTER FOR SCREENING MAMMOGRAM FOR MALIGNANT NEOPLASM OF BREAST: ICD-10-CM

## 2024-11-08 DIAGNOSIS — Z01.419 WELL WOMAN EXAM WITH ROUTINE GYNECOLOGICAL EXAM: Primary | ICD-10-CM

## 2024-11-08 DIAGNOSIS — Z11.51 SCREENING FOR HPV (HUMAN PAPILLOMAVIRUS): ICD-10-CM

## 2024-11-08 PROCEDURE — G0476 HPV COMBO ASSAY CA SCREEN: HCPCS | Performed by: OBSTETRICS & GYNECOLOGY

## 2024-11-08 PROCEDURE — G0145 SCR C/V CYTO,THINLAYER,RESCR: HCPCS | Performed by: OBSTETRICS & GYNECOLOGY

## 2024-11-08 PROCEDURE — S0612 ANNUAL GYNECOLOGICAL EXAMINA: HCPCS | Performed by: OBSTETRICS & GYNECOLOGY

## 2024-11-08 NOTE — PROGRESS NOTES
ASSESSMENT & PLAN:   Diagnoses and all orders for this visit:    Well woman exam with routine gynecological exam  -     Liquid-based pap, screening    Encounter for screening for malignant neoplasm of cervix  -     Liquid-based pap, screening    Screening for HPV (human papillomavirus)  -     Liquid-based pap, screening    Encounter for screening mammogram for malignant neoplasm of breast  -     Mammo screening bilateral w 3d and cad; Future    Screen for colon cancer  -     Cancel: Ambulatory Referral to Gastroenterology; Future          The following were reviewed in today's visit: ASCCP guidelines, Gardisil vaccination, STD testing breast self exam, mammography screening ordered, menopause, osteoporosis, exercise, healthy diet, and colon cancer screening reviewed.    Patient to return to office in yearly for annual exam.     All questions have been answered to her satisfaction.        CC:  Annual Gynecologic Examination  Chief Complaint   Patient presents with    Gynecologic Exam     Annual exam, pap indicated. Pt is well, no gyn concerns at this time.   Last pap 10/15/2020 NILM ([facility]:ordered If [facility])   Mammo 2023 Negative, repeat 1 yr   DXA 2023 Normal bone density   Colonoscopy 2020 -Repeat in 5 yrs due to personal hx of colon polyps       New Patient Visit     New patient, establish care. Pt previously saw Dr. Poornima Fontenot from [facility].   Denies hx of abnormal pap smear        HPI: Kiesha Boykin is a 64 y.o.  who presents for annual gynecologic examination.  She has the following concerns:  no gyn concerns.    Prior gyn care with [facility], Dr. Fontenot  Was seen in  for postmenopausal bleeding that was attributed to endometrial atrophy/sloughing. No bleeding concerns since.   Denies history of abnormal pap smear.       Health Maintenance:    Exercise: occasionally - walking daily. Encouraged strength exercising.   Breast exams/breast awareness: yes  Last mammogram:  - [facility]  1  Colorectal cancer screenin - repeat recommended in 5 years, due . Saw Dr. RICK Sepulveda from [facility].     Past Medical History:   Diagnosis Date    Allergic     Anxiety     Arthritis     Colon polyp     COVID-19 2022    Neoplasm of ovary     Bilateral    PONV (postoperative nausea and vomiting)     Uterine polyp        Past Surgical History:   Procedure Laterality Date    CHOLECYSTECTOMY      COLONOSCOPY      [place] [facility] WRST SURG W/RLS TRANSVRS CARPL LIGM Right 2017    Procedure: ENDOSCOPIC CARPAL TUNNEL RELEASE ;  Surgeon: Sergei Warner MD;  Location: [facility];  Service: Orthopedics    UTERINE FIBROID SURGERY         Past OB/Gyn History:   No LMP recorded. Patient is postmenopausal.    Menopausal status: postmenopausal  Menopausal symptoms: hot flashes occasionally    Last Pap: 2020 : no abnormalities  History of abnormal Pap smear: no    Patient is not currently sexually active.   STD testing: no  Current contraception: post menopausal status      Family History  Family History   Problem Relation Age of Onset    Hypothyroidism Mother     Dementia Mother     Aortic aneurysm Mother         Abdominal    Arthritis Mother     Diverticulitis Mother     Pancreatitis Mother     Other Mother         Tachycardia    Alcohol abuse Father     Breast cancer Sister 61    Celiac disease Sister         sprue    Cancer Sister         Brain cancer    Other Sister         brain tumor    Crohn's disease Daughter     Autoimmune disease Daughter     No Known Problems Son     Cancer Maternal Grandmother         unsure of type    No Known Problems Maternal Grandfather     Diabetes Paternal Grandmother     No Known Problems Maternal Aunt     Colon cancer Maternal Aunt     No Known Problems Maternal Aunt     Ovarian cancer Neg Hx        Family history of uterine or ovarian cancer: no  Family history of breast cancer: yes - sister  Family history of colon cancer: no    Social History:  Social History  "    Socioeconomic History    Marital status: /Civil Union     Spouse name: Not on file    Number of children: Not on file    Years of education: Not on file    Highest education level: Not on file   Occupational History    Occupation:    Tobacco Use    Smoking status: Never    Smokeless tobacco: Never   Vaping Use    Vaping status: Never Used   Substance and Sexual Activity    Alcohol use: Not Currently     Comment:   Alcohol use (1/mo)    Drug use: No    Sexual activity: Not Currently     Partners: Male     Birth control/protection: None   Other Topics Concern    Not on file   Social History Narrative    Exercise habits:  1x week for 45 min    Living situations:  With , son, daughter, dog     Social Determinants of Health     Financial Resource Strain: Not on file   Food Insecurity: Not on file   Transportation Needs: Not on file   Physical Activity: Not on file   Stress: Not on file   Social Connections: Not on file   Intimate Partner Violence: Not on file   Housing Stability: Not on file     Domestic violence screen: negative    Allergies:  Allergies   Allergen Reactions    Penicillins Other (See Comments), Itching and Rash     Serum sickness  Pt developed \"crystals\", and itchy rash    Amoxicillin Rash and Syncope       Medications:    Current Outpatient Medications:     Calcium Carb-Cholecalciferol 600-800 MG-UNIT TABS, Take by mouth, Disp: , Rfl:     cetirizine (ZyrTEC) 10 mg tablet, Take 1 tablet by mouth as needed , Disp: , Rfl:     cholecalciferol (VITAMIN D3) 1,000 units tablet, Take 1 tablet by mouth daily, Disp: , Rfl:     escitalopram (LEXAPRO) 10 mg tablet, TAKE 1 TABLET BY MOUTH EVERY DAY, Disp: 90 tablet, Rfl: 1    Multiple Vitamins-Minerals (WOMENS MULTIVITAMIN PO), Take by mouth daily, Disp: , Rfl:     naproxen (Naprosyn) 500 mg tablet, Take 1 tablet (500 mg total) by mouth 2 (two) times a day with meals for 10 days, Disp: 20 tablet, Rfl: 0    fluticasone (FLONASE) " "50 mcg/act nasal spray, SPRAY 2 SPRAYS INTO EACH NOSTRIL EVERY DAY (Patient not taking: Reported on 11/8/2024), Disp: 16 mL, Rfl: 3    meloxicam (MOBIC) 15 mg tablet, TAKE 1 TABLET (15 MG TOTAL) BY MOUTH DAILY TAKE WITH FOOD OR MILK. (Patient not taking: Reported on 11/8/2024), Disp: 90 tablet, Rfl: 0    Xiidra 5 % op solution, Administer 1 drop to both eyes every 12 (twelve) hours (Patient not taking: Reported on 11/8/2024), Disp: , Rfl:     Review of Systems:  Review of Systems   Constitutional:  Negative for activity change, appetite change and unexpected weight change.   Respiratory:  Negative for cough and shortness of breath.    Cardiovascular:  Negative for chest pain.   Gastrointestinal:  Negative for abdominal pain, constipation, diarrhea, nausea and vomiting.   Genitourinary:  Negative for difficulty urinating, dyspareunia, frequency, menstrual problem, pelvic pain, urgency, vaginal bleeding, vaginal discharge and vaginal pain.   Musculoskeletal:  Negative for back pain.   Skin: Negative.    Neurological:  Negative for dizziness, weakness, light-headedness and headaches.   Psychiatric/Behavioral: Negative.           Physical Exam:  /88 (BP Location: Left arm, Patient Position: Sitting, Cuff Size: Standard)   Ht 5' 4.78\" (1.645 m)   Wt 86.6 kg (191 lb)   BMI 32.00 kg/m²    Physical Exam  Constitutional:       General: She is not in acute distress.     Appearance: Normal appearance. She is well-developed. She is not diaphoretic.   Genitourinary:      Vulva and bladder normal.      No lesions in the vagina.      Genitourinary Comments: Perineum normal in appearance, no lacerations, no ulcerations, no lesions visualized.      Right Labia: No rash, tenderness or lesions.     Left Labia: No tenderness, lesions or rash.     No inguinal adenopathy present in the right or left side.     No vaginal discharge, erythema, tenderness or bleeding.      Apical vaginal prolapse present.     No vaginal atrophy " present.       Right Adnexa: not tender, not full and no mass present.     Left Adnexa: not tender, not full and no mass present.     Cervix is parous.      No cervical motion tenderness, discharge, friability, lesion or polyp.      No parametrium nodularity or thickening present.     Uterus is prolapsed (descent to 2cm behind hymenal ring).      Uterus is not enlarged or tender.      No uterine mass detected.     Uterus is midaxial.      No urethral prolapse or mass present.      Bladder is not tender.       Pelvic exam was performed with patient in the lithotomy position.   Rectum:      No tenderness or external hemorrhoid.   Breasts:     Breasts are symmetrical.      Right: No swelling, bleeding, mass, skin change or tenderness.      Left: No swelling, bleeding, mass, skin change or tenderness.   HENT:      Head: Normocephalic and atraumatic.   Neck:      Thyroid: No thyromegaly or thyroid tenderness.   Cardiovascular:      Rate and Rhythm: Normal rate and regular rhythm.      Heart sounds: Normal heart sounds. No murmur heard.     No friction rub.   Pulmonary:      Effort: Pulmonary effort is normal. No respiratory distress.      Breath sounds: Normal breath sounds. No wheezing or rales.   Abdominal:      Palpations: Abdomen is soft. There is no mass.      Tenderness: There is no abdominal tenderness. There is no guarding.   Musculoskeletal:         General: No tenderness. Normal range of motion.      Right lower leg: No edema.      Left lower leg: No edema.   Lymphadenopathy:      Lower Body: No right inguinal adenopathy. No left inguinal adenopathy.   Neurological:      Mental Status: She is alert and oriented to person, place, and time.   Skin:     General: Skin is warm and dry.      Coloration: Skin is not pale.      Findings: No erythema.   Psychiatric:         Mood and Affect: Mood normal.         Behavior: Behavior normal.         Thought Content: Thought content normal.         Judgment: Judgment normal.    Vitals and nursing note reviewed.

## 2024-11-11 LAB
HPV HR 12 DNA CVX QL NAA+PROBE: NEGATIVE
HPV16 DNA CVX QL NAA+PROBE: NEGATIVE
HPV18 DNA CVX QL NAA+PROBE: NEGATIVE

## 2024-11-13 ENCOUNTER — RESULTS FOLLOW-UP (OUTPATIENT)
Dept: OBGYN CLINIC | Facility: CLINIC | Age: 64
End: 2024-11-13

## 2024-11-13 LAB
LAB AP GYN PRIMARY INTERPRETATION: NORMAL
Lab: NORMAL

## 2024-11-22 ENCOUNTER — RA CDI HCC (OUTPATIENT)
Dept: OTHER | Facility: HOSPITAL | Age: 64
End: 2024-11-22

## 2024-11-22 NOTE — PROGRESS NOTES
HCC coding opportunities          Chart Reviewed number of suggestions sent to Provider: 1   F32.0    Patients Insurance        Commercial Insurance: [employer]

## 2024-11-25 ENCOUNTER — HOSPITAL ENCOUNTER (OUTPATIENT)
Dept: RADIOLOGY | Age: 64
Discharge: HOME | End: 2024-11-25
Payer: COMMERCIAL

## 2024-11-25 DIAGNOSIS — Z12.31 ENCOUNTER FOR SCREENING MAMMOGRAM FOR MALIGNANT NEOPLASM OF BREAST: ICD-10-CM

## 2024-11-25 PROCEDURE — 77067 SCR MAMMO BI INCL CAD: CPT

## 2024-11-25 PROCEDURE — 77063 BREAST TOMOSYNTHESIS BI: CPT

## 2024-11-26 ENCOUNTER — OFFICE VISIT (OUTPATIENT)
Dept: FAMILY MEDICINE CLINIC | Facility: CLINIC | Age: 64
End: 2024-11-26
Payer: COMMERCIAL

## 2024-11-26 VITALS
RESPIRATION RATE: 17 BRPM | TEMPERATURE: 98.1 F | OXYGEN SATURATION: 96 % | BODY MASS INDEX: 32.95 KG/M2 | HEIGHT: 64 IN | DIASTOLIC BLOOD PRESSURE: 86 MMHG | HEART RATE: 86 BPM | SYSTOLIC BLOOD PRESSURE: 130 MMHG | WEIGHT: 193 LBS

## 2024-11-26 DIAGNOSIS — L72.0 CYST OF SKIN AND SUBCUTANEOUS TISSUE: ICD-10-CM

## 2024-11-26 DIAGNOSIS — E55.9 VITAMIN D DEFICIENCY: ICD-10-CM

## 2024-11-26 DIAGNOSIS — F41.9 ANXIETY: ICD-10-CM

## 2024-11-26 DIAGNOSIS — M35.9 CONNECTIVE TISSUE DISEASE (HCC): ICD-10-CM

## 2024-11-26 DIAGNOSIS — M25.50 ARTHRALGIA OF MULTIPLE JOINTS: ICD-10-CM

## 2024-11-26 DIAGNOSIS — Z00.00 ANNUAL PHYSICAL EXAM: Primary | ICD-10-CM

## 2024-11-26 DIAGNOSIS — F32.0 MAJOR DEPRESSIVE DISORDER, SINGLE EPISODE, MILD (HCC): ICD-10-CM

## 2024-11-26 DIAGNOSIS — Z13.820 OSTEOPOROSIS SCREENING: ICD-10-CM

## 2024-11-26 DIAGNOSIS — Z23 NEED FOR COVID-19 VACCINE: ICD-10-CM

## 2024-11-26 PROCEDURE — 99396 PREV VISIT EST AGE 40-64: CPT | Performed by: FAMILY MEDICINE

## 2024-11-26 PROCEDURE — 91320 SARSCV2 VAC 30MCG TRS-SUC IM: CPT

## 2024-11-26 PROCEDURE — 90480 ADMN SARSCOV2 VAC 1/ONLY CMP: CPT

## 2024-11-26 RX ORDER — ESCITALOPRAM OXALATE 10 MG/1
10 TABLET ORAL DAILY
Qty: 90 TABLET | Refills: 1 | Status: SHIPPED | OUTPATIENT
Start: 2024-11-26

## 2024-11-26 RX ORDER — MELOXICAM 15 MG/1
15 TABLET ORAL DAILY
Qty: 90 TABLET | Refills: 0 | Status: SHIPPED | OUTPATIENT
Start: 2024-11-26

## 2024-11-26 NOTE — PROGRESS NOTES
Adult Annual Physical  Name: Kiesha Boykin      : 1960      MRN: [id number]  Encounter Provider: Emily Betancourt MD  Encounter Date: 2024   Encounter department: [facility]    Assessment & Plan  Annual physical exam    Orders:    Lipid panel    CBC and differential    Comprehensive metabolic panel    Connective tissue disease (HCC)    Orders:    C-reactive protein; Future    Arthralgia of multiple joints    Orders:    meloxicam (MOBIC) 15 mg tablet; Take 1 tablet (15 mg total) by mouth daily Take with food or milk.    Anxiety    Orders:    escitalopram (LEXAPRO) 10 mg tablet; Take 1 tablet (10 mg total) by mouth daily    Major depressive disorder, single episode, mild (HCC)  Stable on current meds         Vitamin D deficiency  On vitamin D 1000 IU daily          Osteoporosis screening    Orders:    DXA bone density spine hip and pelvis; Future    Need for COVID-19 vaccine    Orders:    COVID-19 Pfizer mRNA vaccine 12 yr and older (Comirnaty pre-filled syringe)    Cyst of skin and subcutaneous tissue    Orders:    Ambulatory Referral to General Surgery; Future    Immunizations and preventive care screenings were discussed with patient today. Appropriate education was printed on patient's after visit summary.    Counseling:  Alcohol/drug use: discussed moderation in alcohol intake, the recommendations for healthy alcohol use, and avoidance of illicit drug use.  Dental Health: discussed importance of regular tooth brushing, flossing, and dental visits.  Injury prevention: discussed safety/seat belts, safety helmets, smoke detectors, carbon monoxide detectors, and smoking near bedding or upholstery.  Sexual health: discussed sexually transmitted diseases, partner selection, use of condoms, avoidance of unintended pregnancy, and contraceptive alternatives.  Exercise: the importance of regular exercise/physical activity was discussed. Recommend exercise 3-5 times per week for at least  30 minutes.       Depression Screening and Follow-up Plan: Patient was screened for depression during today's encounter. They screened negative with a PHQ-9 score of 0.        History of Present Illness     Adult Annual Physical:  Patient presents for annual physical.     Diet and Physical Activity:  - Diet/Nutrition: well balanced diet and consuming 3-5 servings of fruits/vegetables daily.  - Exercise: walking and 5-7 times a week on average.    Depression Screening:    - PHQ-9 Score: 0    General Health:  - Sleep: sleeps well and 7-8 hours of sleep on average.  - Hearing: normal hearing bilateral ears.  - Vision: vision problems, wears glasses and wears glasses and contacts.  - Dental: regular dental visits.    /GYN Health:  - Follows with GYN: no.   - Menopause: postmenopausal.   - History of STDs: no    Advanced Care Planning:  - Has an advanced directive?: no    - Has a durable medical POA?: no    - ACP document given to patient?: no      Review of Systems   Constitutional: Negative.    HENT: Negative.     Eyes: Negative.    Respiratory: Negative.     Cardiovascular: Negative.    Gastrointestinal: Negative.    Endocrine: Negative.    Genitourinary: Negative.    Musculoskeletal: Negative.    Skin: Negative.    Allergic/Immunologic: Negative.    Neurological: Negative.    Hematological: Negative.    Psychiatric/Behavioral: Negative.       Medical History Reviewed by provider this encounter:  Allergies  Meds  Problems     .  Past Medical History   Past Medical History:   Diagnosis Date    Allergic     Anxiety     Arthritis     Carpal tunnel syndrome of left wrist 04/20/2016    Colon polyp     COVID-19 12/20/2022    Neoplasm of ovary     Bilateral    PONV (postoperative nausea and vomiting)     Uterine polyp      Past Surgical History:   Procedure Laterality Date    CHOLECYSTECTOMY      COLONOSCOPY      [place] [facility]SC WRST SURG W/RLS TRANSVRS CARPL LIGM Right 6/2/2017    Procedure: ENDOSCOPIC CARPAL TUNNEL RELEASE ;   Surgeon: Sergei Warner MD;  Location: [facility];  Service: Orthopedics    UTERINE FIBROID SURGERY       Family History   Problem Relation Age of Onset    Hypothyroidism Mother     Dementia Mother     Aortic aneurysm Mother         Abdominal    Arthritis Mother     Diverticulitis Mother     Pancreatitis Mother     Other Mother         Tachycardia    Alcohol abuse Father     Breast cancer Sister 61    Celiac disease Sister         sprue    Cancer Sister         Brain cancer    Other Sister         brain tumor    Crohn's disease Daughter     Autoimmune disease Daughter     No Known Problems Son     Cancer Maternal Grandmother         unsure of type    No Known Problems Maternal Grandfather     Diabetes Paternal Grandmother     No Known Problems Maternal Aunt     Colon cancer Maternal Aunt     No Known Problems Maternal Aunt     Ovarian cancer Neg Hx       reports that she has never smoked. She has never been exposed to tobacco smoke. She has never used smokeless tobacco. She reports current alcohol use. She reports that she does not use drugs.  Current Outpatient Medications on File Prior to Visit   Medication Sig Dispense Refill    Calcium Carb-Cholecalciferol 600-800 MG-UNIT TABS Take by mouth      cetirizine (ZyrTEC) 10 mg tablet Take 1 tablet by mouth as needed       cholecalciferol (VITAMIN D3) 1,000 units tablet Take 1 tablet by mouth daily      fluticasone (FLONASE) 50 mcg/act nasal spray SPRAY 2 SPRAYS INTO EACH NOSTRIL EVERY DAY 16 mL 3    Multiple Vitamins-Minerals (WOMENS MULTIVITAMIN PO) Take by mouth daily      Xiidra 5 % op solution Administer 1 drop to both eyes every 12 (twelve) hours      [DISCONTINUED] escitalopram (LEXAPRO) 10 mg tablet TAKE 1 TABLET BY MOUTH EVERY DAY 90 tablet 1    [DISCONTINUED] meloxicam (MOBIC) 15 mg tablet TAKE 1 TABLET (15 MG TOTAL) BY MOUTH DAILY TAKE WITH FOOD OR MILK. 90 tablet 0    [DISCONTINUED] naproxen (Naprosyn) 500 mg tablet Take 1 tablet (500 mg total) by mouth 2  "(two) times a day with meals for 10 days 20 tablet 0     No current facility-administered medications on file prior to visit.     Allergies   Allergen Reactions    Penicillins Other (See Comments), Itching and Rash     Serum sickness  Pt developed \"crystals\", and itchy rash    Amoxicillin Rash and Syncope      Current Outpatient Medications on File Prior to Visit   Medication Sig Dispense Refill    Calcium Carb-Cholecalciferol 600-800 MG-UNIT TABS Take by mouth      cetirizine (ZyrTEC) 10 mg tablet Take 1 tablet by mouth as needed       cholecalciferol (VITAMIN D3) 1,000 units tablet Take 1 tablet by mouth daily      fluticasone (FLONASE) 50 mcg/act nasal spray SPRAY 2 SPRAYS INTO EACH NOSTRIL EVERY DAY 16 mL 3    Multiple Vitamins-Minerals (WOMENS MULTIVITAMIN PO) Take by mouth daily      Xiidra 5 % op solution Administer 1 drop to both eyes every 12 (twelve) hours      [DISCONTINUED] escitalopram (LEXAPRO) 10 mg tablet TAKE 1 TABLET BY MOUTH EVERY DAY 90 tablet 1    [DISCONTINUED] meloxicam (MOBIC) 15 mg tablet TAKE 1 TABLET (15 MG TOTAL) BY MOUTH DAILY TAKE WITH FOOD OR MILK. 90 tablet 0    [DISCONTINUED] naproxen (Naprosyn) 500 mg tablet Take 1 tablet (500 mg total) by mouth 2 (two) times a day with meals for 10 days 20 tablet 0     No current facility-administered medications on file prior to visit.      Social History     Tobacco Use    Smoking status: Never     Passive exposure: Never    Smokeless tobacco: Never   Vaping Use    Vaping status: Never Used   Substance and Sexual Activity    Alcohol use: Yes     Comment:   Alcohol use (1/mo)    Drug use: No    Sexual activity: Not Currently     Partners: Male     Birth control/protection: None       Objective   /86 (BP Location: Left arm, Patient Position: Sitting, Cuff Size: Standard)   Pulse 86   Temp 98.1 °F (36.7 °C) (Rectal)   Resp 17   Ht 5' 3.9\" (1.623 m)   Wt 87.5 kg (193 lb)   SpO2 96%   BMI 33.23 kg/m²     Physical Exam  Vitals and nursing " note reviewed.   Constitutional:       General: She is not in acute distress.     Appearance: Normal appearance. She is well-developed.   HENT:      Head: Normocephalic and atraumatic.      Right Ear: Tympanic membrane normal.      Left Ear: Tympanic membrane normal.      Nose: Nose normal.      Mouth/Throat:      Mouth: Mucous membranes are moist.   Eyes:      Conjunctiva/sclera: Conjunctivae normal.   Cardiovascular:      Rate and Rhythm: Normal rate and regular rhythm.      Heart sounds: No murmur heard.  Pulmonary:      Effort: Pulmonary effort is normal. No respiratory distress.      Breath sounds: Normal breath sounds.   Abdominal:      Palpations: Abdomen is soft.      Tenderness: There is no abdominal tenderness.   Musculoskeletal:         General: No swelling.      Cervical back: Normal range of motion and neck supple.   Skin:     General: Skin is warm and dry.      Capillary Refill: Capillary refill takes less than 2 seconds.      Findings: No lesion.      Comments: Mobile lump Left side forehead above eyebrow    Neurological:      General: No focal deficit present.      Mental Status: She is alert and oriented to person, place, and time.   Psychiatric:         Mood and Affect: Mood normal.

## 2024-12-04 ENCOUNTER — OFFICE VISIT (OUTPATIENT)
Dept: SURGERY | Facility: CLINIC | Age: 64
End: 2024-12-04
Payer: COMMERCIAL

## 2024-12-04 VITALS
HEART RATE: 79 BPM | OXYGEN SATURATION: 97 % | DIASTOLIC BLOOD PRESSURE: 84 MMHG | WEIGHT: 194.6 LBS | BODY MASS INDEX: 33.22 KG/M2 | HEIGHT: 64 IN | TEMPERATURE: 97.6 F | SYSTOLIC BLOOD PRESSURE: 130 MMHG

## 2024-12-04 DIAGNOSIS — L72.0 CYST OF SKIN AND SUBCUTANEOUS TISSUE: Primary | ICD-10-CM

## 2024-12-04 PROCEDURE — 99243 OFF/OP CNSLTJ NEW/EST LOW 30: CPT | Performed by: SURGERY

## 2024-12-04 NOTE — H&P (VIEW-ONLY)
Name: Kiesha Boykin      : 1960      MRN: [id number]  Encounter Provider: Vikas Swanson MD  Encounter Date: 2024   Encounter department: [facility] GENERAL SURG[facility]  :  Assessment & Plan  Cyst of skin and subcutaneous tissue    Orders:    Ambulatory Referral to General Surgery    Case request operating room: EXCISION BIOPSY LESION /MASS FACIAL/NECK; Standing      This is a dermoid cyst.  Since it is growing in size I will excise it under local anesthesia on 2024.  She signed the consent.  I told her to stop her supplements 1 week prior to the procedure.  History of Present Illness     64-year-old female patient came to my office with complaints of a cyst over her left eyebrow.  She says it has been there for many years however in last 1 year it has become doubled the size.  She says it has started itching.  No complaints of drainage.  No complaints of bleeding.  No complaints of redness.  No complaints of pain.  Patient has history of cholecystectomy in the past.  She also has history of carpal tunnel decompression done on the right side.      Kiesha Boykin is a 64 y.o. female who presents   History obtained from: patient    Review of Systems   Constitutional: Negative.    HENT: Negative.     Eyes: Negative.    Respiratory: Negative.     Cardiovascular: Negative.    Gastrointestinal: Negative.    Endocrine: Negative.    Genitourinary: Negative.    Musculoskeletal: Negative.    Skin: Negative.    Allergic/Immunologic: Negative.    Neurological: Negative.    Hematological: Negative.    Psychiatric/Behavioral: Negative.       Medical History Reviewed by provider this encounter:  Tobacco  Allergies  Meds  Problems  Med Hx  Surg Hx  Fam Hx     .  Past Medical History   Past Medical History:   Diagnosis Date    Allergic     Anxiety     Arthritis     Carpal tunnel syndrome of left wrist 2016    Colon polyp     COVID-19 2022    Hypertension     Neoplasm of  ovary     Bilateral    PONV (postoperative nausea and vomiting)     Uterine polyp      Past Surgical History:   Procedure Laterality Date    CHOLECYSTECTOMY      COLONOSCOPY      [place] [facility] WRST SURG W/RLS TRANSVRS CARPL LIGM Right 6/2/2017    Procedure: ENDOSCOPIC CARPAL TUNNEL RELEASE ;  Surgeon: Sergei Warner MD;  Location: [facility];  Service: Orthopedics    UTERINE FIBROID SURGERY       Family History   Problem Relation Age of Onset    Hypothyroidism Mother     Dementia Mother     Aortic aneurysm Mother         Abdominal    Arthritis Mother     Diverticulitis Mother     Pancreatitis Mother     Alcohol abuse Father     Breast cancer Sister 61    Celiac disease Sister         sprue    Cancer Sister         Brain cancer    Crohn's disease Daughter     Autoimmune disease Daughter     No Known Problems Son     Cancer Maternal Grandmother         unsure of type    No Known Problems Maternal Grandfather     Diabetes Paternal Grandmother     No Known Problems Maternal Aunt     Colon cancer Maternal Aunt     No Known Problems Maternal Aunt     Ovarian cancer Neg Hx       reports that she has never smoked. She has never been exposed to tobacco smoke. She has never used smokeless tobacco. She reports current alcohol use. She reports that she does not use drugs.  Current Outpatient Medications on File Prior to Visit   Medication Sig Dispense Refill    Calcium Carb-Cholecalciferol 600-800 MG-UNIT TABS Take by mouth      cetirizine (ZyrTEC) 10 mg tablet Take 1 tablet by mouth as needed       cholecalciferol (VITAMIN D3) 1,000 units tablet Take 1 tablet by mouth daily      escitalopram (LEXAPRO) 10 mg tablet Take 1 tablet (10 mg total) by mouth daily 90 tablet 1    fluticasone (FLONASE) 50 mcg/act nasal spray SPRAY 2 SPRAYS INTO EACH NOSTRIL EVERY DAY 16 mL 3    meloxicam (MOBIC) 15 mg tablet Take 1 tablet (15 mg total) by mouth daily Take with food or milk. 90 tablet 0    Multiple Vitamins-Minerals (WOMENS MULTIVITAMIN PO)  "Take by mouth daily      Xiidra 5 % op solution Administer 1 drop to both eyes every 12 (twelve) hours       No current facility-administered medications on file prior to visit.     Allergies   Allergen Reactions    Penicillins Other (See Comments), Itching and Rash     Serum sickness  Pt developed \"crystals\", and itchy rash    Amoxicillin Rash and Syncope      Current Outpatient Medications on File Prior to Visit   Medication Sig Dispense Refill    Calcium Carb-Cholecalciferol 600-800 MG-UNIT TABS Take by mouth      cetirizine (ZyrTEC) 10 mg tablet Take 1 tablet by mouth as needed       cholecalciferol (VITAMIN D3) 1,000 units tablet Take 1 tablet by mouth daily      escitalopram (LEXAPRO) 10 mg tablet Take 1 tablet (10 mg total) by mouth daily 90 tablet 1    fluticasone (FLONASE) 50 mcg/act nasal spray SPRAY 2 SPRAYS INTO EACH NOSTRIL EVERY DAY 16 mL 3    meloxicam (MOBIC) 15 mg tablet Take 1 tablet (15 mg total) by mouth daily Take with food or milk. 90 tablet 0    Multiple Vitamins-Minerals (WOMENS MULTIVITAMIN PO) Take by mouth daily      Xiidra 5 % op solution Administer 1 drop to both eyes every 12 (twelve) hours       No current facility-administered medications on file prior to visit.      Social History     Tobacco Use    Smoking status: Never     Passive exposure: Never    Smokeless tobacco: Never   Vaping Use    Vaping status: Never Used   Substance and Sexual Activity    Alcohol use: Yes     Comment: Hardly ever    Drug use: No    Sexual activity: Not Currently     Partners: Male     Birth control/protection: None        Objective   /84 (BP Location: Left arm, Patient Position: Sitting, Cuff Size: Standard)   Pulse 79   Temp 97.6 °F (36.4 °C) (Tympanic)   Ht 5' 3.9\" (1.623 m)   Wt 88.3 kg (194 lb 9.6 oz)   SpO2 97%   BMI 33.51 kg/m²      Physical Exam  Vitals reviewed.   Constitutional:       Appearance: Normal appearance.   HENT:      Head: Normocephalic and atraumatic.        Comments: 2 " cm mobile swelling superior to the left eyebrow     Nose: Nose normal.   Eyes:      Pupils: Pupils are equal, round, and reactive to light.   Cardiovascular:      Rate and Rhythm: Normal rate and regular rhythm.   Pulmonary:      Effort: Pulmonary effort is normal.      Breath sounds: Normal breath sounds.   Musculoskeletal:         General: Normal range of motion.      Cervical back: Normal range of motion.   Skin:     General: Skin is warm.   Neurological:      General: No focal deficit present.      Mental Status: She is alert and oriented to person, place, and time.   Psychiatric:         Mood and Affect: Mood normal.         Behavior: Behavior normal.         Administrative Statements   I have spent a total time of 20 minutes in caring for this patient on the day of the visit/encounter including Prognosis, Risks and benefits of tx options, Instructions for management, Patient and family education, Importance of tx compliance, Risk factor reductions, Impressions, Counseling / Coordination of care, Documenting in the medical record, Reviewing / ordering tests, medicine, procedures  , Obtaining or reviewing history  , and Communicating with other healthcare professionals .

## 2024-12-04 NOTE — PROGRESS NOTES
Name: Kiesha Boykin      : 1960      MRN: [id number]  Encounter Provider: Vikas Swanson MD  Encounter Date: 2024   Encounter department: [facility] GENERAL SURGERY [facility]  :  Assessment & Plan  Cyst of skin and subcutaneous tissue    Orders:    Ambulatory Referral to General Surgery    Case request operating room: EXCISION BIOPSY LESION /MASS FACIAL/NECK; Standing      This is a dermoid cyst.  Since it is growing in size I will excise it under local anesthesia on 2024.  She signed the consent.  I told her to stop her supplements 1 week prior to the procedure.  History of Present Illness     64-year-old female patient came to my office with complaints of a cyst over her left eyebrow.  She says it has been there for many years however in last 1 year it has become doubled the size.  She says it has started itching.  No complaints of drainage.  No complaints of bleeding.  No complaints of redness.  No complaints of pain.  Patient has history of cholecystectomy in the past.  She also has history of carpal tunnel decompression done on the right side.      Kiesha Boykin is a 64 y.o. female who presents   History obtained from: patient    Review of Systems   Constitutional: Negative.    HENT: Negative.     Eyes: Negative.    Respiratory: Negative.     Cardiovascular: Negative.    Gastrointestinal: Negative.    Endocrine: Negative.    Genitourinary: Negative.    Musculoskeletal: Negative.    Skin: Negative.    Allergic/Immunologic: Negative.    Neurological: Negative.    Hematological: Negative.    Psychiatric/Behavioral: Negative.       Medical History Reviewed by provider this encounter:  Tobacco  Allergies  Meds  Problems  Med Hx  Surg Hx  Fam Hx     .  Past Medical History   Past Medical History:   Diagnosis Date    Allergic     Anxiety     Arthritis     Carpal tunnel syndrome of left wrist 2016    Colon polyp     COVID-19 2022    Hypertension     Neoplasm of  ovary     Bilateral    PONV (postoperative nausea and vomiting)     Uterine polyp      Past Surgical History:   Procedure Laterality Date    CHOLECYSTECTOMY      COLONOSCOPY      [place] [facility]SC WRST SURG W/RLS TRANSVRS CARPL LIGM Right 6/2/2017    Procedure: ENDOSCOPIC CARPAL TUNNEL RELEASE ;  Surgeon: Sergei Warner MD;  Location: [facility];  Service: Orthopedics    UTERINE FIBROID SURGERY       Family History   Problem Relation Age of Onset    Hypothyroidism Mother     Dementia Mother     Aortic aneurysm Mother         Abdominal    Arthritis Mother     Diverticulitis Mother     Pancreatitis Mother     Alcohol abuse Father     Breast cancer Sister 61    Celiac disease Sister         sprue    Cancer Sister         Brain cancer    Crohn's disease Daughter     Autoimmune disease Daughter     No Known Problems Son     Cancer Maternal Grandmother         unsure of type    No Known Problems Maternal Grandfather     Diabetes Paternal Grandmother     No Known Problems Maternal Aunt     Colon cancer Maternal Aunt     No Known Problems Maternal Aunt     Ovarian cancer Neg Hx       reports that she has never smoked. She has never been exposed to tobacco smoke. She has never used smokeless tobacco. She reports current alcohol use. She reports that she does not use drugs.  Current Outpatient Medications on File Prior to Visit   Medication Sig Dispense Refill    Calcium Carb-Cholecalciferol 600-800 MG-UNIT TABS Take by mouth      cetirizine (ZyrTEC) 10 mg tablet Take 1 tablet by mouth as needed       cholecalciferol (VITAMIN D3) 1,000 units tablet Take 1 tablet by mouth daily      escitalopram (LEXAPRO) 10 mg tablet Take 1 tablet (10 mg total) by mouth daily 90 tablet 1    fluticasone (FLONASE) 50 mcg/act nasal spray SPRAY 2 SPRAYS INTO EACH NOSTRIL EVERY DAY 16 mL 3    meloxicam (MOBIC) 15 mg tablet Take 1 tablet (15 mg total) by mouth daily Take with food or milk. 90 tablet 0    Multiple Vitamins-Minerals (WOMENS MULTIVITAMIN PO)  "Take by mouth daily      Xiidra 5 % op solution Administer 1 drop to both eyes every 12 (twelve) hours       No current facility-administered medications on file prior to visit.     Allergies   Allergen Reactions    Penicillins Other (See Comments), Itching and Rash     Serum sickness  Pt developed \"crystals\", and itchy rash    Amoxicillin Rash and Syncope      Current Outpatient Medications on File Prior to Visit   Medication Sig Dispense Refill    Calcium Carb-Cholecalciferol 600-800 MG-UNIT TABS Take by mouth      cetirizine (ZyrTEC) 10 mg tablet Take 1 tablet by mouth as needed       cholecalciferol (VITAMIN D3) 1,000 units tablet Take 1 tablet by mouth daily      escitalopram (LEXAPRO) 10 mg tablet Take 1 tablet (10 mg total) by mouth daily 90 tablet 1    fluticasone (FLONASE) 50 mcg/act nasal spray SPRAY 2 SPRAYS INTO EACH NOSTRIL EVERY DAY 16 mL 3    meloxicam (MOBIC) 15 mg tablet Take 1 tablet (15 mg total) by mouth daily Take with food or milk. 90 tablet 0    Multiple Vitamins-Minerals (WOMENS MULTIVITAMIN PO) Take by mouth daily      Xiidra 5 % op solution Administer 1 drop to both eyes every 12 (twelve) hours       No current facility-administered medications on file prior to visit.      Social History     Tobacco Use    Smoking status: Never     Passive exposure: Never    Smokeless tobacco: Never   Vaping Use    Vaping status: Never Used   Substance and Sexual Activity    Alcohol use: Yes     Comment: Hardly ever    Drug use: No    Sexual activity: Not Currently     Partners: Male     Birth control/protection: None        Objective   /84 (BP Location: Left arm, Patient Position: Sitting, Cuff Size: Standard)   Pulse 79   Temp 97.6 °F (36.4 °C) (Tympanic)   Ht 5' 3.9\" (1.623 m)   Wt 88.3 kg (194 lb 9.6 oz)   SpO2 97%   BMI 33.51 kg/m²      Physical Exam  Vitals reviewed.   Constitutional:       Appearance: Normal appearance.   HENT:      Head: Normocephalic and atraumatic.        Comments: 2 " cm mobile swelling superior to the left eyebrow     Nose: Nose normal.   Eyes:      Pupils: Pupils are equal, round, and reactive to light.   Cardiovascular:      Rate and Rhythm: Normal rate and regular rhythm.   Pulmonary:      Effort: Pulmonary effort is normal.      Breath sounds: Normal breath sounds.   Musculoskeletal:         General: Normal range of motion.      Cervical back: Normal range of motion.   Skin:     General: Skin is warm.   Neurological:      General: No focal deficit present.      Mental Status: She is alert and oriented to person, place, and time.   Psychiatric:         Mood and Affect: Mood normal.         Behavior: Behavior normal.         Administrative Statements   I have spent a total time of 20 minutes in caring for this patient on the day of the visit/encounter including Prognosis, Risks and benefits of tx options, Instructions for management, Patient and family education, Importance of tx compliance, Risk factor reductions, Impressions, Counseling / Coordination of care, Documenting in the medical record, Reviewing / ordering tests, medicine, procedures  , Obtaining or reviewing history  , and Communicating with other healthcare professionals .

## 2024-12-07 ENCOUNTER — APPOINTMENT (OUTPATIENT)
Dept: LAB | Facility: CLINIC | Age: 64
End: 2024-12-07
Payer: COMMERCIAL

## 2024-12-07 DIAGNOSIS — M35.9 CONNECTIVE TISSUE DISEASE (HCC): ICD-10-CM

## 2024-12-07 LAB
ALBUMIN SERPL BCG-MCNC: 4.4 G/DL (ref 3.5–5)
ALP SERPL-CCNC: 58 U/L (ref 34–104)
ALT SERPL W P-5'-P-CCNC: 22 U/L (ref 7–52)
ANION GAP SERPL CALCULATED.3IONS-SCNC: 6 MMOL/L (ref 4–13)
AST SERPL W P-5'-P-CCNC: 19 U/L (ref 13–39)
BASOPHILS # BLD AUTO: 0.03 THOUSANDS/ΜL (ref 0–0.1)
BASOPHILS NFR BLD AUTO: 1 % (ref 0–1)
BILIRUB SERPL-MCNC: 0.71 MG/DL (ref 0.2–1)
BUN SERPL-MCNC: 17 MG/DL (ref 5–25)
CALCIUM SERPL-MCNC: 9.8 MG/DL (ref 8.4–10.2)
CHLORIDE SERPL-SCNC: 105 MMOL/L (ref 96–108)
CHOLEST SERPL-MCNC: 261 MG/DL (ref ?–200)
CO2 SERPL-SCNC: 29 MMOL/L (ref 21–32)
CREAT SERPL-MCNC: 0.67 MG/DL (ref 0.6–1.3)
CRP SERPL QL: 4.5 MG/L
EOSINOPHIL # BLD AUTO: 0.09 THOUSAND/ΜL (ref 0–0.61)
EOSINOPHIL NFR BLD AUTO: 3 % (ref 0–6)
ERYTHROCYTE [DISTWIDTH] IN BLOOD BY AUTOMATED COUNT: 13.3 % (ref 11.6–15.1)
GFR SERPL CREATININE-BSD FRML MDRD: 94 ML/MIN/1.73SQ M
GLUCOSE P FAST SERPL-MCNC: 89 MG/DL (ref 65–99)
HCT VFR BLD AUTO: 42.7 % (ref 34.8–46.1)
HDLC SERPL-MCNC: 76 MG/DL
HGB BLD-MCNC: 14.3 G/DL (ref 11.5–15.4)
IMM GRANULOCYTES # BLD AUTO: 0 THOUSAND/UL (ref 0–0.2)
IMM GRANULOCYTES NFR BLD AUTO: 0 % (ref 0–2)
LDLC SERPL CALC-MCNC: 162 MG/DL (ref 0–100)
LYMPHOCYTES # BLD AUTO: 0.87 THOUSANDS/ΜL (ref 0.6–4.47)
LYMPHOCYTES NFR BLD AUTO: 28 % (ref 14–44)
MCH RBC QN AUTO: 31 PG (ref 26.8–34.3)
MCHC RBC AUTO-ENTMCNC: 33.5 G/DL (ref 31.4–37.4)
MCV RBC AUTO: 93 FL (ref 82–98)
MONOCYTES # BLD AUTO: 0.45 THOUSAND/ΜL (ref 0.17–1.22)
MONOCYTES NFR BLD AUTO: 15 % (ref 4–12)
NEUTROPHILS # BLD AUTO: 1.64 THOUSANDS/ΜL (ref 1.85–7.62)
NEUTS SEG NFR BLD AUTO: 53 % (ref 43–75)
NONHDLC SERPL-MCNC: 185 MG/DL
NRBC BLD AUTO-RTO: 0 /100 WBCS
PLATELET # BLD AUTO: 257 THOUSANDS/UL (ref 149–390)
PMV BLD AUTO: 10.2 FL (ref 8.9–12.7)
POTASSIUM SERPL-SCNC: 4.2 MMOL/L (ref 3.5–5.3)
PROT SERPL-MCNC: 7.5 G/DL (ref 6.4–8.4)
RBC # BLD AUTO: 4.61 MILLION/UL (ref 3.81–5.12)
SODIUM SERPL-SCNC: 140 MMOL/L (ref 135–147)
TRIGL SERPL-MCNC: 116 MG/DL (ref ?–150)
WBC # BLD AUTO: 3.08 THOUSAND/UL (ref 4.31–10.16)

## 2024-12-07 PROCEDURE — 36415 COLL VENOUS BLD VENIPUNCTURE: CPT

## 2024-12-07 PROCEDURE — 80053 COMPREHEN METABOLIC PANEL: CPT | Performed by: FAMILY MEDICINE

## 2024-12-07 PROCEDURE — 86140 C-REACTIVE PROTEIN: CPT

## 2024-12-07 PROCEDURE — 80061 LIPID PANEL: CPT | Performed by: FAMILY MEDICINE

## 2024-12-07 PROCEDURE — 85025 COMPLETE CBC W/AUTO DIFF WBC: CPT | Performed by: FAMILY MEDICINE

## 2024-12-09 ENCOUNTER — RESULTS FOLLOW-UP (OUTPATIENT)
Dept: FAMILY MEDICINE CLINIC | Facility: CLINIC | Age: 64
End: 2024-12-09

## 2024-12-12 ENCOUNTER — HOSPITAL ENCOUNTER (OUTPATIENT)
Facility: HOSPITAL | Age: 64
Setting detail: OUTPATIENT SURGERY
Discharge: HOME | End: 2024-12-12
Attending: SURGERY | Admitting: SURGERY
Payer: COMMERCIAL

## 2024-12-12 VITALS
OXYGEN SATURATION: 95 % | WEIGHT: 192.4 LBS | BODY MASS INDEX: 34.09 KG/M2 | RESPIRATION RATE: 18 BRPM | HEART RATE: 86 BPM | DIASTOLIC BLOOD PRESSURE: 70 MMHG | SYSTOLIC BLOOD PRESSURE: 124 MMHG | TEMPERATURE: 98.3 F | HEIGHT: 63 IN

## 2024-12-12 DIAGNOSIS — L72.0 CYST OF SKIN AND SUBCUTANEOUS TISSUE: ICD-10-CM

## 2024-12-12 PROCEDURE — 11442 EXC FACE-MM B9+MARG 1.1-2 CM: CPT | Performed by: SURGERY

## 2024-12-12 PROCEDURE — 88304 TISSUE EXAM BY PATHOLOGIST: CPT | Performed by: PATHOLOGY

## 2024-12-12 PROCEDURE — 11442 EXC FACE-MM B9+MARG 1.1-2 CM: CPT | Performed by: PHYSICIAN ASSISTANT

## 2024-12-12 RX ORDER — MAGNESIUM HYDROXIDE 1200 MG/15ML
LIQUID ORAL AS NEEDED
Status: DISCONTINUED | OUTPATIENT
Start: 2024-12-12 | End: 2024-12-12 | Stop reason: HOSPADM

## 2024-12-12 RX ORDER — LIDOCAINE HYDROCHLORIDE AND EPINEPHRINE 10; 10 MG/ML; UG/ML
INJECTION, SOLUTION INFILTRATION; PERINEURAL AS NEEDED
Status: DISCONTINUED | OUTPATIENT
Start: 2024-12-12 | End: 2024-12-12 | Stop reason: HOSPADM

## 2024-12-12 NOTE — INTERVAL H&P NOTE
H&P reviewed. After examining the patient I find no changes in the patients condition since the H&P had been written.    Vitals:    12/12/24 1401   BP: 144/80   Pulse: 88   Resp: 18   Temp: 98.5 °F (36.9 °C)   SpO2: 96%

## 2024-12-12 NOTE — OP NOTE
OPERATIVE REPORT  PATIENT NAME: Kiesha Boykin    :  1960  MRN: [id number]  Pt Location: [facility] OR ROOM 02    SURGERY DATE: 2024    Surgeons and Role:     * Vikas Swanson MD - Primary     * Filomena Cee PA-C - Assisting    Preop Diagnosis:  Cyst of skin and subcutaneous tissue [L72.0]    Post-Op Diagnosis Codes:     * Cyst of skin and subcutaneous tissue [L72.0]    Procedure(s):  Left - EXCISION BIOPSY LESION /MASS FACIAL/NECK  Excision 1.5 cm cyst left eyebrow  Specimen(s):  ID Type Source Tests Collected by Time Destination   1 : LEFT EYEBROW SEBACEOUS CYST Tissue Cyst TISSUE EXAM Vikas Swanson MD 2024 [id number]        Estimated Blood Loss:   Minimal    Drains:  * No LDAs found *    Anesthesia Type:   Local    Operative Indications:  Cyst of skin and subcutaneous tissue [L72.0]      Operative Findings:  Sebaceous cyst 1.5 cm left eyebrow      Complications:   None    Procedure and Technique:  She was brought to the operating room.  She was laid in supine position.  Parts prepped and draped in standard fashion.  Timeout performed.  Local anesthesia using 1% lidocaine and epi was given surrounding the area of the cyst.  Transverse incision was made deepened through the subcutaneous tissue.  The cyst was excised in its entirety.  Hemostasis achieved with pressure.  The skin was then closed in a simple fashion using 4-0 Monocryl subcuticular.  Exofin was applied.  Patient was then transferred to the recovery under stable condition.   I was present for the entire procedure., A qualified resident physician was not available., and A physician assistant was required during the procedure for retraction, tissue handling, dissection and suturing.    Patient Disposition:  PACU              SIGNATURE: Vikas Swanson MD  DATE: 2024  TIME: 2:35 PM

## 2024-12-12 NOTE — PERIOPERATIVE NURSING NOTE
Patient was received from OR RNs Tisha and Michael.  Patient is alert and awake; no distress noted. Patient has no complaints at this time.  Incisional site over L-eyebrow remains clean, dry, and intact. Minor edema noted.  AVS and medication detail reviewed with patient. Patient verbalized understanding of the education provided and stated that she had no further questions at this time.  Patient discharged via walking, accompanied by this RN (author).

## 2024-12-16 PROCEDURE — 88304 TISSUE EXAM BY PATHOLOGIST: CPT | Performed by: PATHOLOGY

## 2025-03-18 DIAGNOSIS — F41.9 ANXIETY: ICD-10-CM

## 2025-03-18 NOTE — TELEPHONE ENCOUNTER
Reason for call:   [x] Refill   [] Prior Auth  [] Other:     Office:   [x] PCP/Provider - Emily Betancourt,   [] Specialty/Provider -     Medication: escitalopram (LEXAPRO) 10 mg   Dose/Frequency: 1 daily  Quantity: 90    Pharmacy: [facility]    Local Pharmacy   Does the patient have enough for 3 days?   [x] Yes   [] No - Send as HP to POD    Mail Away Pharmacy   Does the patient have enough for 10 days?   [] Yes   [] No - Send as HP to POD

## 2025-03-19 RX ORDER — ESCITALOPRAM OXALATE 10 MG/1
10 TABLET ORAL DAILY
Qty: 90 TABLET | Refills: 0 | Status: SHIPPED | OUTPATIENT
Start: 2025-03-19

## 2025-04-02 ENCOUNTER — OFFICE VISIT (OUTPATIENT)
Dept: FAMILY MEDICINE CLINIC | Facility: CLINIC | Age: 65
End: 2025-04-02
Payer: COMMERCIAL

## 2025-04-02 VITALS
BODY MASS INDEX: 33.49 KG/M2 | WEIGHT: 189 LBS | RESPIRATION RATE: 17 BRPM | SYSTOLIC BLOOD PRESSURE: 120 MMHG | TEMPERATURE: 97.1 F | HEART RATE: 71 BPM | HEIGHT: 63 IN | DIASTOLIC BLOOD PRESSURE: 80 MMHG | OXYGEN SATURATION: 98 %

## 2025-04-02 DIAGNOSIS — J06.9 ACUTE URI: Primary | ICD-10-CM

## 2025-04-02 DIAGNOSIS — R09.81 STUFFY NOSE: ICD-10-CM

## 2025-04-02 DIAGNOSIS — M35.9 CONNECTIVE TISSUE DISEASE (HCC): ICD-10-CM

## 2025-04-02 LAB
SARS-COV-2 AG UPPER RESP QL IA: NEGATIVE
VALID CONTROL: NORMAL

## 2025-04-02 PROCEDURE — 87811 SARS-COV-2 COVID19 W/OPTIC: CPT | Performed by: FAMILY MEDICINE

## 2025-04-02 PROCEDURE — 99213 OFFICE O/P EST LOW 20 MIN: CPT | Performed by: FAMILY MEDICINE

## 2025-04-02 RX ORDER — PREDNISONE 10 MG/1
TABLET ORAL
Qty: 20 TABLET | Refills: 0 | Status: SHIPPED | OUTPATIENT
Start: 2025-04-02

## 2025-04-02 NOTE — PROGRESS NOTES
"Name: Kiesha Boykin      : 1960      MRN: [id number]  Encounter Provider: Emily Betancourt MD  Encounter Date: 2025   [facility]  :  Assessment & Plan  Acute URI  To continue supportive care  Negative covid test      Orders:  •  predniSONE 10 mg tablet; 4 tabs x 2 days, 3 tabs x 2 days, 2 tabs x 2 days, 1 tab x 2 days    Stuffy nose    Orders:  •  POCT Rapid Covid Ag    Connective tissue disease (HCC)  Stable                 History of Present Illness     Dry Cough for the last 2 weeks  Nasal congestion and stuffy nose for the last 4 days   Some sinus pressure  No fever/chills    URI   This is a new problem. The current episode started in the past 7 days. The problem has been waxing and waning. There has been no fever. Associated symptoms include congestion, coughing and sinus pain. Pertinent negatives include no abdominal pain, chest pain, diarrhea, dysuria, ear pain, headaches, joint pain, joint swelling, nausea, neck pain, plugged ear sensation, rash, rhinorrhea, sneezing, sore throat, swollen glands, vomiting or wheezing.       Review of Systems   HENT:  Positive for congestion and sinus pain. Negative for ear pain, rhinorrhea, sneezing and sore throat.    Respiratory:  Positive for cough. Negative for wheezing.    Cardiovascular:  Negative for chest pain.   Gastrointestinal:  Negative for abdominal pain, diarrhea, nausea and vomiting.   Genitourinary:  Negative for dysuria.   Musculoskeletal:  Negative for joint pain and neck pain.   Skin:  Negative for rash.   Neurological:  Negative for headaches.       Objective   /80 (BP Location: Left arm, Patient Position: Sitting, Cuff Size: Standard)   Pulse 71   Temp (!) 97.1 °F (36.2 °C) (Tympanic)   Resp 17   Ht 5' 3\" (1.6 m)   Wt 85.7 kg (189 lb)   SpO2 98%   BMI 33.48 kg/m²      Physical Exam  Vitals and nursing note reviewed.   Constitutional:       Appearance: Normal appearance.   HENT:      " Right Ear: Tympanic membrane normal.      Left Ear: Tympanic membrane normal.   Cardiovascular:      Pulses: Normal pulses.      Heart sounds: Normal heart sounds.   Pulmonary:      Effort: Pulmonary effort is normal.      Breath sounds: Normal breath sounds.   Neurological:      Mental Status: She is alert.

## 2025-06-17 DIAGNOSIS — F41.9 ANXIETY: ICD-10-CM

## 2025-06-17 RX ORDER — ESCITALOPRAM OXALATE 10 MG/1
10 TABLET ORAL DAILY
Qty: 90 TABLET | Refills: 0 | Status: SHIPPED | OUTPATIENT
Start: 2025-06-17

## 2025-06-17 NOTE — TELEPHONE ENCOUNTER
Reason for call:   [x] Refill   [] Prior Auth  [] Other:     Office:   [x] PCP/Provider - Emily Betancourt MD   [] Specialty/Provider -     Medication:  escitalopram (LEXAPRO) 10 mg tablet    Dose/Frequency:  Take 1 tablet (10 mg total) by mouth daily     Quantity: 90    Pharmacy: [id number] - [place] - [address] [facility]   Does the patient have enough for 3 days?   [x] Yes   [] No - Send as HP to POD    Mail Away Pharmacy   Does the patient have enough for 10 days?   [] Yes   [] No - Send as HP to POD

## (undated) DEVICE — TRIANGLE KNIFE BOX OF 6: Brand: ECTRA

## (undated) DEVICE — STOCKINETTE REGULAR

## (undated) DEVICE — GLOVE INDICATOR PI UNDERGLOVE SZ 8 BLUE

## (undated) DEVICE — COTTON TIP APPLICTOR 2 PK

## (undated) DEVICE — OCCLUSIVE GAUZE STRIP,3% BISMUTH TRIBROMOPHENATE IN PETROLATUM BLEND: Brand: XEROFORM

## (undated) DEVICE — ACE WRAP 3 IN STERILE

## (undated) DEVICE — CUFF TOURNIQUET 18 X 4 IN QUICK CONNECT DISP 1 BLADDER

## (undated) DEVICE — BETHLEHEM UNIVERSAL MINOR GEN: Brand: CARDINAL HEALTH

## (undated) DEVICE — LIGHT HANDLE COVER SLEEVE DISP BLUE STELLAR

## (undated) DEVICE — ADHESIVE SKN CLSR HISTOACRYL FLEX 0.5ML LF

## (undated) DEVICE — STERILE ALLENTOWN HAND PACK: Brand: CARDINAL HEALTH

## (undated) DEVICE — PAD GROUNDING DUAL ADULT

## (undated) DEVICE — CHLORAPREP HI-LITE 26ML ORANGE

## (undated) DEVICE — SUT MONOCRYL 5-0 P-3 18 IN Y493G

## (undated) DEVICE — MEDI-VAC YANK SUCT HNDL W/TPRD BULBOUS TIP: Brand: CARDINAL HEALTH

## (undated) DEVICE — INTENDED FOR TISSUE SEPARATION, AND OTHER PROCEDURES THAT REQUIRE A SHARP SURGICAL BLADE TO PUNCTURE OR CUT.: Brand: BARD-PARKER SAFETY BLADES SIZE 15, STERILE

## (undated) DEVICE — SUT VICRYL 2-0 SH 27 IN UNDYED J417H

## (undated) DEVICE — SPONGE PVP SCRUB WING STERILE

## (undated) DEVICE — 3M™ TEGADERM™ TRANSPARENT FILM DRESSING FRAME STYLE, 1626W, 4 IN X 4-3/4 IN (10 CM X 12 CM), 50/CT 4CT/CASE: Brand: 3M™ TEGADERM™

## (undated) DEVICE — SUT MONOCRYL PLUS 4-0 PS-2 27IN MCP426H

## (undated) DEVICE — NEEDLE 25G X 1 1/2

## (undated) DEVICE — TUBING SUCTION 5MM X 12 FT

## (undated) DEVICE — DRAPE SHEET THREE QUARTER

## (undated) DEVICE — GLOVE SRG BIOGEL 7.5

## (undated) DEVICE — EXOFIN PRECISION PEN HIGH VISCOSITY TOPICAL SKIN ADHESIVE: Brand: EXOFIN PRECISION PEN, 1G

## (undated) DEVICE — PLUMEPEN PRO 10FT

## (undated) DEVICE — SUT MONOCRYL 4-0 PS-2 18 IN Y496G

## (undated) DEVICE — GAUZE SPONGES,16 PLY: Brand: CURITY

## (undated) DEVICE — ACE WRAP 4 IN STERILE

## (undated) DEVICE — GLOVE SRG BIOGEL ECLIPSE 7.5

## (undated) DEVICE — PADDING CAST 4 IN  COTTON STRL